# Patient Record
Sex: MALE | Race: WHITE
[De-identification: names, ages, dates, MRNs, and addresses within clinical notes are randomized per-mention and may not be internally consistent; named-entity substitution may affect disease eponyms.]

---

## 2018-09-26 ENCOUNTER — RX RENEWAL (OUTPATIENT)
Age: 68
End: 2018-09-26

## 2018-11-19 ENCOUNTER — RX RENEWAL (OUTPATIENT)
Age: 68
End: 2018-11-19

## 2019-02-01 ENCOUNTER — RX RENEWAL (OUTPATIENT)
Age: 69
End: 2019-02-01

## 2019-03-07 ENCOUNTER — RX RENEWAL (OUTPATIENT)
Age: 69
End: 2019-03-07

## 2019-04-30 ENCOUNTER — RX RENEWAL (OUTPATIENT)
Age: 69
End: 2019-04-30

## 2019-08-01 ENCOUNTER — RX RENEWAL (OUTPATIENT)
Age: 69
End: 2019-08-01

## 2019-08-20 ENCOUNTER — RX RENEWAL (OUTPATIENT)
Age: 69
End: 2019-08-20

## 2019-08-20 ENCOUNTER — APPOINTMENT (OUTPATIENT)
Dept: HEART AND VASCULAR | Facility: CLINIC | Age: 69
End: 2019-08-20

## 2019-08-27 ENCOUNTER — APPOINTMENT (OUTPATIENT)
Dept: HEART AND VASCULAR | Facility: CLINIC | Age: 69
End: 2019-08-27
Payer: MEDICARE

## 2019-08-27 ENCOUNTER — NON-APPOINTMENT (OUTPATIENT)
Age: 69
End: 2019-08-27

## 2019-08-27 VITALS — BODY MASS INDEX: 42.66 KG/M2 | HEIGHT: 72 IN | WEIGHT: 315 LBS

## 2019-08-27 VITALS
DIASTOLIC BLOOD PRESSURE: 80 MMHG | HEIGHT: 72 IN | SYSTOLIC BLOOD PRESSURE: 116 MMHG | HEART RATE: 90 BPM | RESPIRATION RATE: 12 BRPM | WEIGHT: 315 LBS | BODY MASS INDEX: 42.66 KG/M2

## 2019-08-27 PROCEDURE — 93000 ELECTROCARDIOGRAM COMPLETE: CPT

## 2019-08-27 PROCEDURE — 99214 OFFICE O/P EST MOD 30 MIN: CPT

## 2019-08-27 RX ORDER — METFORMIN HYDROCHLORIDE 1000 MG/1
1000 TABLET, COATED ORAL
Qty: 60 | Refills: 4 | Status: DISCONTINUED | COMMUNITY
Start: 2019-02-01 | End: 2019-08-27

## 2019-08-27 NOTE — HISTORY OF PRESENT ILLNESS
[FreeTextEntry1] : 69-year-old male with a past medical history of diabetes hypertension hyperlipidemia comes in for routine followup. Patient denies any chest pain shortness of breath PND or orthopnea. However, does complain of severe lower back pain on exertion relieved with rest. Denies any intermittent claudication.

## 2019-08-27 NOTE — PHYSICAL EXAM
[Normal Appearance] : normal appearance [General Appearance - Well Developed] : well developed [Well Groomed] : well groomed [No Deformities] : no deformities [Normal Oral Mucosa] : normal oral mucosa [General Appearance - In No Acute Distress] : no acute distress [FreeTextEntry1] : overweight [No Oral Cyanosis] : no oral cyanosis [No Oral Pallor] : no oral pallor [Normal Jugular Venous A Waves Present] : normal jugular venous A waves present [Normal Jugular Venous V Waves Present] : normal jugular venous V waves present [No Jugular Venous Yuen A Waves] : no jugular venous yuen A waves [Exaggerated Use Of Accessory Muscles For Inspiration] : no accessory muscle use [Respiration, Rhythm And Depth] : normal respiratory rhythm and effort [Auscultation Breath Sounds / Voice Sounds] : lungs were clear to auscultation bilaterally [Heart Rate And Rhythm] : heart rate and rhythm were normal [Heart Sounds] : normal S1 and S2 [Arterial Pulses Normal] : the arterial pulses were normal [Edema] : no peripheral edema present [Abdomen Soft] : soft [Abdomen Tenderness] : non-tender [Abdomen Mass (___ Cm)] : no abdominal mass palpated [Abnormal Walk] : normal gait [Gait - Sufficient For Exercise Testing] : the gait was sufficient for exercise testing [Nail Clubbing] : no clubbing of the fingernails [Cyanosis, Localized] : no localized cyanosis [Petechial Hemorrhages (___cm)] : no petechial hemorrhages [] : no ischemic changes [Oriented To Time, Place, And Person] : oriented to person, place, and time [Affect] : the affect was normal [Mood] : the mood was normal [No Anxiety] : not feeling anxious

## 2019-08-27 NOTE — DISCUSSION/SUMMARY
[FreeTextEntry1] : 1. Hypertension: Well controlled on enalapril 10 mg twice daily advised to maintain a low salt diet weight loss.\par 2. Hyperlipidemia: Continue Crestor 10 mg daily will obtain blood work and advise once results noted. Advised low-fat diet\par 3. Diabetes: Continue Januvia 100 mg daily metformin 1000 mg twice daily advised aspirin 81 mg daily to see ophthalmology and podiatry yearly.\par \par pt refusing echo today and advised t ohave screening CF. pt refusing states he sees surgeon yearly.

## 2019-08-29 ENCOUNTER — RX RENEWAL (OUTPATIENT)
Age: 69
End: 2019-08-29

## 2019-08-29 DIAGNOSIS — E78.1 PURE HYPERGLYCERIDEMIA: ICD-10-CM

## 2019-08-29 LAB
25(OH)D3 SERPL-MCNC: 19.7 NG/ML
ALBUMIN SERPL ELPH-MCNC: 4.3 G/DL
ALP BLD-CCNC: 79 U/L
ALT SERPL-CCNC: 28 U/L
ANION GAP SERPL CALC-SCNC: 22 MMOL/L
AST SERPL-CCNC: 22 U/L
BASOPHILS # BLD AUTO: 0.05 K/UL
BASOPHILS NFR BLD AUTO: 0.5 %
BILIRUB SERPL-MCNC: 0.3 MG/DL
BUN SERPL-MCNC: 28 MG/DL
CALCIUM SERPL-MCNC: 10 MG/DL
CHLORIDE SERPL-SCNC: 96 MMOL/L
CHOLEST SERPL-MCNC: 170 MG/DL
CHOLEST/HDLC SERPL: 5.3 RATIO
CO2 SERPL-SCNC: 18 MMOL/L
CREAT SERPL-MCNC: 1.24 MG/DL
EOSINOPHIL # BLD AUTO: 0.29 K/UL
EOSINOPHIL NFR BLD AUTO: 2.7 %
ESTIMATED AVERAGE GLUCOSE: 280 MG/DL
FOLATE SERPL-MCNC: 16.3 NG/ML
GLUCOSE SERPL-MCNC: 403 MG/DL
HBA1C MFR BLD HPLC: 11.4 %
HCT VFR BLD CALC: 39.9 %
HDLC SERPL-MCNC: 32 MG/DL
HGB BLD-MCNC: 13 G/DL
IMM GRANULOCYTES NFR BLD AUTO: 0.7 %
LDLC SERPL CALC-MCNC: NORMAL MG/DL
LYMPHOCYTES # BLD AUTO: 1.54 K/UL
LYMPHOCYTES NFR BLD AUTO: 14.6 %
MAGNESIUM SERPL-MCNC: 1.8 MG/DL
MAN DIFF?: NORMAL
MCHC RBC-ENTMCNC: 31.6 PG
MCHC RBC-ENTMCNC: 32.6 GM/DL
MCV RBC AUTO: 96.8 FL
MONOCYTES # BLD AUTO: 0.79 K/UL
MONOCYTES NFR BLD AUTO: 7.5 %
NEUTROPHILS # BLD AUTO: 7.81 K/UL
NEUTROPHILS NFR BLD AUTO: 74 %
PHOSPHATE SERPL-MCNC: 3.5 MG/DL
PLATELET # BLD AUTO: 192 K/UL
POTASSIUM SERPL-SCNC: 5 MMOL/L
PROT SERPL-MCNC: 6.8 G/DL
PSA SERPL-MCNC: 1.01 NG/ML
RBC # BLD: 4.12 M/UL
RBC # FLD: 13.9 %
SODIUM SERPL-SCNC: 136 MMOL/L
T3FREE SERPL-MCNC: 2.42 PG/ML
T4 FREE SERPL-MCNC: 1.2 NG/DL
T4 SERPL-MCNC: 6.4 UG/DL
TRIGL SERPL-MCNC: 701 MG/DL
TSH SERPL-ACNC: 1.71 UIU/ML
VIT B12 SERPL-MCNC: 883 PG/ML
WBC # FLD AUTO: 10.55 K/UL

## 2019-11-18 ENCOUNTER — APPOINTMENT (OUTPATIENT)
Dept: HEART AND VASCULAR | Facility: CLINIC | Age: 69
End: 2019-11-18
Payer: MEDICARE

## 2019-11-18 ENCOUNTER — RX RENEWAL (OUTPATIENT)
Age: 69
End: 2019-11-18

## 2019-11-18 VITALS
SYSTOLIC BLOOD PRESSURE: 112 MMHG | RESPIRATION RATE: 12 BRPM | DIASTOLIC BLOOD PRESSURE: 70 MMHG | WEIGHT: 315 LBS | HEART RATE: 80 BPM | BODY MASS INDEX: 42.66 KG/M2 | HEIGHT: 72 IN

## 2019-11-18 PROCEDURE — 99214 OFFICE O/P EST MOD 30 MIN: CPT

## 2019-11-18 NOTE — DISCUSSION/SUMMARY
[FreeTextEntry1] : 1. Hypertension: Well controlled on enalapril 10 mg twice daily advised to continue maintain a low-salt diet and continued weight loss.\par 2. Hyperlipidemia: Continue gemfibrozil and Crestor maintain a low-fat diet\par 3. Diabetes: Continue oral hypoglycemics maintain a low glycemic diet will obtain blood work and advise once results are known. Advised to see ophthalmology yearly\par 4. Back pain: Concerned about the need for rest after half a block of walking. Will refer her for an MRI of the LS-spine and advise once results known.

## 2019-11-18 NOTE — PHYSICAL EXAM
[General Appearance - Well Developed] : well developed [Well Groomed] : well groomed [Normal Appearance] : normal appearance [General Appearance - In No Acute Distress] : no acute distress [No Deformities] : no deformities [Normal Oral Mucosa] : normal oral mucosa [FreeTextEntry1] : obese [No Oral Pallor] : no oral pallor [No Oral Cyanosis] : no oral cyanosis [Normal Jugular Venous A Waves Present] : normal jugular venous A waves present [No Jugular Venous Yuen A Waves] : no jugular venous yuen A waves [Normal Jugular Venous V Waves Present] : normal jugular venous V waves present [Respiration, Rhythm And Depth] : normal respiratory rhythm and effort [Auscultation Breath Sounds / Voice Sounds] : lungs were clear to auscultation bilaterally [Exaggerated Use Of Accessory Muscles For Inspiration] : no accessory muscle use [Heart Rate And Rhythm] : heart rate and rhythm were normal [Heart Sounds] : normal S1 and S2 [Arterial Pulses Normal] : the arterial pulses were normal [Edema] : no peripheral edema present [Abdomen Soft] : soft [Abdomen Tenderness] : non-tender [Abdomen Mass (___ Cm)] : no abdominal mass palpated [Abnormal Walk] : normal gait [Gait - Sufficient For Exercise Testing] : the gait was sufficient for exercise testing [Cyanosis, Localized] : no localized cyanosis [Petechial Hemorrhages (___cm)] : no petechial hemorrhages [Nail Clubbing] : no clubbing of the fingernails [] : no ischemic changes [Oriented To Time, Place, And Person] : oriented to person, place, and time [Affect] : the affect was normal [No Anxiety] : not feeling anxious [Mood] : the mood was normal

## 2019-11-18 NOTE — HISTORY OF PRESENT ILLNESS
[FreeTextEntry1] : 69-year-old male with a past medical history hypertension hyperlipidemia and diabetes comes in for routine followup. Patient does report significant lumbosacral spine pain to the point is unable to walk half a block without stopping due to pain in the LS spine area radiating to bilateral flanks. Denies any chest pain shortness of breath PND or orthopnea.

## 2019-11-20 LAB
ALBUMIN SERPL ELPH-MCNC: 4.4 G/DL
ALP BLD-CCNC: 91 U/L
ALT SERPL-CCNC: 15 U/L
ANION GAP SERPL CALC-SCNC: 17 MMOL/L
AST SERPL-CCNC: 15 U/L
BASOPHILS # BLD AUTO: 0.06 K/UL
BASOPHILS NFR BLD AUTO: 0.7 %
BILIRUB SERPL-MCNC: 0.2 MG/DL
BUN SERPL-MCNC: 38 MG/DL
CALCIUM SERPL-MCNC: 10.2 MG/DL
CHLORIDE SERPL-SCNC: 102 MMOL/L
CHOLEST SERPL-MCNC: 135 MG/DL
CHOLEST/HDLC SERPL: 3.9 RATIO
CO2 SERPL-SCNC: 18 MMOL/L
CREAT SERPL-MCNC: 1.33 MG/DL
EOSINOPHIL # BLD AUTO: 0.32 K/UL
EOSINOPHIL NFR BLD AUTO: 3.5 %
ESTIMATED AVERAGE GLUCOSE: 229 MG/DL
GLUCOSE SERPL-MCNC: 363 MG/DL
HBA1C MFR BLD HPLC: 9.6 %
HCT VFR BLD CALC: 37.2 %
HDLC SERPL-MCNC: 35 MG/DL
HGB BLD-MCNC: 11.8 G/DL
IMM GRANULOCYTES NFR BLD AUTO: 0.6 %
LDLC SERPL CALC-MCNC: 42 MG/DL
LYMPHOCYTES # BLD AUTO: 1.65 K/UL
LYMPHOCYTES NFR BLD AUTO: 18.3 %
MAN DIFF?: NORMAL
MCHC RBC-ENTMCNC: 31.7 GM/DL
MCHC RBC-ENTMCNC: 32.2 PG
MCV RBC AUTO: 101.4 FL
MONOCYTES # BLD AUTO: 0.77 K/UL
MONOCYTES NFR BLD AUTO: 8.5 %
NEUTROPHILS # BLD AUTO: 6.18 K/UL
NEUTROPHILS NFR BLD AUTO: 68.4 %
PLATELET # BLD AUTO: 215 K/UL
POTASSIUM SERPL-SCNC: 5.5 MMOL/L
PROT SERPL-MCNC: 6.9 G/DL
PSA SERPL-MCNC: 1.1 NG/ML
RBC # BLD: 3.67 M/UL
RBC # FLD: 13.9 %
SODIUM SERPL-SCNC: 137 MMOL/L
TRIGL SERPL-MCNC: 289 MG/DL
TSH SERPL-ACNC: 1.35 UIU/ML
WBC # FLD AUTO: 9.03 K/UL

## 2020-01-08 ENCOUNTER — RX RENEWAL (OUTPATIENT)
Age: 70
End: 2020-01-08

## 2020-03-16 ENCOUNTER — APPOINTMENT (OUTPATIENT)
Dept: HEART AND VASCULAR | Facility: CLINIC | Age: 70
End: 2020-03-16

## 2020-05-06 ENCOUNTER — RX RENEWAL (OUTPATIENT)
Age: 70
End: 2020-05-06

## 2020-05-07 ENCOUNTER — RX RENEWAL (OUTPATIENT)
Age: 70
End: 2020-05-07

## 2020-05-26 ENCOUNTER — TRANSCRIPTION ENCOUNTER (OUTPATIENT)
Age: 70
End: 2020-05-26

## 2020-05-27 ENCOUNTER — TRANSCRIPTION ENCOUNTER (OUTPATIENT)
Age: 70
End: 2020-05-27

## 2020-05-27 ENCOUNTER — INPATIENT (INPATIENT)
Facility: HOSPITAL | Age: 70
LOS: 3 days | Discharge: HOME | DRG: 853 | End: 2020-05-31
Attending: SPECIALIST | Admitting: SPECIALIST
Payer: MEDICARE

## 2020-05-27 VITALS
WEIGHT: 315 LBS | OXYGEN SATURATION: 94 % | SYSTOLIC BLOOD PRESSURE: 150 MMHG | HEIGHT: 72 IN | RESPIRATION RATE: 18 BRPM | TEMPERATURE: 99 F | DIASTOLIC BLOOD PRESSURE: 73 MMHG | HEART RATE: 114 BPM

## 2020-05-27 DIAGNOSIS — Z87.891 PERSONAL HISTORY OF NICOTINE DEPENDENCE: ICD-10-CM

## 2020-05-27 DIAGNOSIS — Z91.11 PATIENT'S NONCOMPLIANCE WITH DIETARY REGIMEN: ICD-10-CM

## 2020-05-27 DIAGNOSIS — Z79.84 LONG TERM (CURRENT) USE OF ORAL HYPOGLYCEMIC DRUGS: ICD-10-CM

## 2020-05-27 LAB
ALBUMIN SERPL ELPH-MCNC: 4.2 G/DL — SIGNIFICANT CHANGE UP (ref 3.3–5)
ALP SERPL-CCNC: 96 U/L — SIGNIFICANT CHANGE UP (ref 40–120)
ALT FLD-CCNC: 11 U/L — SIGNIFICANT CHANGE UP (ref 10–45)
ANION GAP SERPL CALC-SCNC: 14 MMOL/L — SIGNIFICANT CHANGE UP (ref 5–17)
APTT BLD: 36.5 SEC — HIGH (ref 27.5–36.3)
AST SERPL-CCNC: 14 U/L — SIGNIFICANT CHANGE UP (ref 10–40)
BASOPHILS # BLD AUTO: 0.06 K/UL — SIGNIFICANT CHANGE UP (ref 0–0.2)
BASOPHILS NFR BLD AUTO: 0.5 % — SIGNIFICANT CHANGE UP (ref 0–2)
BILIRUB SERPL-MCNC: 0.2 MG/DL — SIGNIFICANT CHANGE UP (ref 0.2–1.2)
BLD GP AB SCN SERPL QL: NEGATIVE — SIGNIFICANT CHANGE UP
BUN SERPL-MCNC: 21 MG/DL — SIGNIFICANT CHANGE UP (ref 7–23)
CALCIUM SERPL-MCNC: 9.9 MG/DL — SIGNIFICANT CHANGE UP (ref 8.4–10.5)
CHLORIDE SERPL-SCNC: 100 MMOL/L — SIGNIFICANT CHANGE UP (ref 96–108)
CO2 SERPL-SCNC: 19 MMOL/L — LOW (ref 22–31)
CREAT SERPL-MCNC: 1.06 MG/DL — SIGNIFICANT CHANGE UP (ref 0.5–1.3)
EOSINOPHIL # BLD AUTO: 0.37 K/UL — SIGNIFICANT CHANGE UP (ref 0–0.5)
EOSINOPHIL NFR BLD AUTO: 2.8 % — SIGNIFICANT CHANGE UP (ref 0–6)
GLUCOSE BLDC GLUCOMTR-MCNC: 264 MG/DL — HIGH (ref 70–99)
GLUCOSE SERPL-MCNC: 290 MG/DL — HIGH (ref 70–99)
GRAM STN FLD: SIGNIFICANT CHANGE UP
HCT VFR BLD CALC: 38.8 % — LOW (ref 39–50)
HGB BLD-MCNC: 12.8 G/DL — LOW (ref 13–17)
IMM GRANULOCYTES NFR BLD AUTO: 0.7 % — SIGNIFICANT CHANGE UP (ref 0–1.5)
INR BLD: 1.06 — SIGNIFICANT CHANGE UP (ref 0.88–1.16)
LYMPHOCYTES # BLD AUTO: 1.77 K/UL — SIGNIFICANT CHANGE UP (ref 1–3.3)
LYMPHOCYTES # BLD AUTO: 13.4 % — SIGNIFICANT CHANGE UP (ref 13–44)
MCHC RBC-ENTMCNC: 31.1 PG — SIGNIFICANT CHANGE UP (ref 27–34)
MCHC RBC-ENTMCNC: 33 GM/DL — SIGNIFICANT CHANGE UP (ref 32–36)
MCV RBC AUTO: 94.2 FL — SIGNIFICANT CHANGE UP (ref 80–100)
MONOCYTES # BLD AUTO: 1.15 K/UL — HIGH (ref 0–0.9)
MONOCYTES NFR BLD AUTO: 8.7 % — SIGNIFICANT CHANGE UP (ref 2–14)
NEUTROPHILS # BLD AUTO: 9.8 K/UL — HIGH (ref 1.8–7.4)
NEUTROPHILS NFR BLD AUTO: 73.9 % — SIGNIFICANT CHANGE UP (ref 43–77)
NRBC # BLD: 0 /100 WBCS — SIGNIFICANT CHANGE UP (ref 0–0)
PLATELET # BLD AUTO: 284 K/UL — SIGNIFICANT CHANGE UP (ref 150–400)
POTASSIUM SERPL-MCNC: 4.2 MMOL/L — SIGNIFICANT CHANGE UP (ref 3.5–5.3)
POTASSIUM SERPL-SCNC: 4.2 MMOL/L — SIGNIFICANT CHANGE UP (ref 3.5–5.3)
PROT SERPL-MCNC: 8 G/DL — SIGNIFICANT CHANGE UP (ref 6–8.3)
PROTHROM AB SERPL-ACNC: 12.1 SEC — SIGNIFICANT CHANGE UP (ref 10–12.9)
RBC # BLD: 4.12 M/UL — LOW (ref 4.2–5.8)
RBC # FLD: 13.9 % — SIGNIFICANT CHANGE UP (ref 10.3–14.5)
RH IG SCN BLD-IMP: POSITIVE — SIGNIFICANT CHANGE UP
SARS-COV-2 RNA SPEC QL NAA+PROBE: SIGNIFICANT CHANGE UP
SODIUM SERPL-SCNC: 133 MMOL/L — LOW (ref 135–145)
SPECIMEN SOURCE: SIGNIFICANT CHANGE UP
WBC # BLD: 13.24 K/UL — HIGH (ref 3.8–10.5)
WBC # FLD AUTO: 13.24 K/UL — HIGH (ref 3.8–10.5)

## 2020-05-27 PROCEDURE — 99285 EMERGENCY DEPT VISIT HI MDM: CPT | Mod: CS

## 2020-05-27 PROCEDURE — 99223 1ST HOSP IP/OBS HIGH 75: CPT | Mod: CS,GC

## 2020-05-27 PROCEDURE — 93010 ELECTROCARDIOGRAM REPORT: CPT

## 2020-05-27 PROCEDURE — 71045 X-RAY EXAM CHEST 1 VIEW: CPT | Mod: 26

## 2020-05-27 RX ORDER — DEXTROSE 50 % IN WATER 50 %
15 SYRINGE (ML) INTRAVENOUS ONCE
Refills: 0 | Status: DISCONTINUED | OUTPATIENT
Start: 2020-05-27 | End: 2020-05-31

## 2020-05-27 RX ORDER — VANCOMYCIN HCL 1 G
2000 VIAL (EA) INTRAVENOUS ONCE
Refills: 0 | Status: COMPLETED | OUTPATIENT
Start: 2020-05-27 | End: 2020-05-27

## 2020-05-27 RX ORDER — SODIUM CHLORIDE 9 MG/ML
1000 INJECTION, SOLUTION INTRAVENOUS
Refills: 0 | Status: DISCONTINUED | OUTPATIENT
Start: 2020-05-28 | End: 2020-05-28

## 2020-05-27 RX ORDER — GLUCAGON INJECTION, SOLUTION 0.5 MG/.1ML
1 INJECTION, SOLUTION SUBCUTANEOUS ONCE
Refills: 0 | Status: DISCONTINUED | OUTPATIENT
Start: 2020-05-27 | End: 2020-05-31

## 2020-05-27 RX ORDER — GEMFIBROZIL 600 MG
600 TABLET ORAL
Refills: 0 | Status: DISCONTINUED | OUTPATIENT
Start: 2020-05-27 | End: 2020-05-31

## 2020-05-27 RX ORDER — PIPERACILLIN AND TAZOBACTAM 4; .5 G/20ML; G/20ML
3.38 INJECTION, POWDER, LYOPHILIZED, FOR SOLUTION INTRAVENOUS EVERY 6 HOURS
Refills: 0 | Status: DISCONTINUED | OUTPATIENT
Start: 2020-05-27 | End: 2020-05-29

## 2020-05-27 RX ORDER — PIPERACILLIN AND TAZOBACTAM 4; .5 G/20ML; G/20ML
3.38 INJECTION, POWDER, LYOPHILIZED, FOR SOLUTION INTRAVENOUS ONCE
Refills: 0 | Status: COMPLETED | OUTPATIENT
Start: 2020-05-27 | End: 2020-05-27

## 2020-05-27 RX ORDER — VANCOMYCIN HCL 1 G
2000 VIAL (EA) INTRAVENOUS EVERY 12 HOURS
Refills: 0 | Status: DISCONTINUED | OUTPATIENT
Start: 2020-05-28 | End: 2020-05-29

## 2020-05-27 RX ORDER — INSULIN LISPRO 100/ML
VIAL (ML) SUBCUTANEOUS
Refills: 0 | Status: DISCONTINUED | OUTPATIENT
Start: 2020-05-27 | End: 2020-05-31

## 2020-05-27 RX ORDER — SODIUM CHLORIDE 9 MG/ML
1000 INJECTION, SOLUTION INTRAVENOUS
Refills: 0 | Status: DISCONTINUED | OUTPATIENT
Start: 2020-05-27 | End: 2020-05-31

## 2020-05-27 RX ORDER — ACETAMINOPHEN 500 MG
650 TABLET ORAL EVERY 6 HOURS
Refills: 0 | Status: DISCONTINUED | OUTPATIENT
Start: 2020-05-27 | End: 2020-05-31

## 2020-05-27 RX ORDER — DEXTROSE 50 % IN WATER 50 %
25 SYRINGE (ML) INTRAVENOUS ONCE
Refills: 0 | Status: DISCONTINUED | OUTPATIENT
Start: 2020-05-27 | End: 2020-05-31

## 2020-05-27 RX ORDER — ATORVASTATIN CALCIUM 80 MG/1
40 TABLET, FILM COATED ORAL AT BEDTIME
Refills: 0 | Status: DISCONTINUED | OUTPATIENT
Start: 2020-05-27 | End: 2020-05-31

## 2020-05-27 RX ORDER — DEXTROSE 50 % IN WATER 50 %
12.5 SYRINGE (ML) INTRAVENOUS ONCE
Refills: 0 | Status: DISCONTINUED | OUTPATIENT
Start: 2020-05-27 | End: 2020-05-31

## 2020-05-27 RX ADMIN — PIPERACILLIN AND TAZOBACTAM 200 GRAM(S): 4; .5 INJECTION, POWDER, LYOPHILIZED, FOR SOLUTION INTRAVENOUS at 22:03

## 2020-05-27 RX ADMIN — PIPERACILLIN AND TAZOBACTAM 3.38 GRAM(S): 4; .5 INJECTION, POWDER, LYOPHILIZED, FOR SOLUTION INTRAVENOUS at 16:25

## 2020-05-27 RX ADMIN — ATORVASTATIN CALCIUM 40 MILLIGRAM(S): 80 TABLET, FILM COATED ORAL at 22:03

## 2020-05-27 RX ADMIN — Medication 6: at 22:03

## 2020-05-27 RX ADMIN — Medication 250 MILLIGRAM(S): at 16:40

## 2020-05-27 RX ADMIN — PIPERACILLIN AND TAZOBACTAM 200 GRAM(S): 4; .5 INJECTION, POWDER, LYOPHILIZED, FOR SOLUTION INTRAVENOUS at 16:06

## 2020-05-27 NOTE — H&P ADULT - HISTORY OF PRESENT ILLNESS
70M w/ PMHx DM, HTN, HLD, PSHx debridement of neck abscess w/ Dr Dixon 5 years years ago, now p/w recurrent neck abscess, onset 6 days ago. Pt started on Bactrim 3 days ago w/o improvement. Pt referred to the ED today by Dr Dixon for admission for OR tomorrow. No f/c. No URI, GI or  sx. No dec ROM neck. No other complaints. Of note, pt COVID+ 3/30, and states his repeat test 4 weeks ago was negative, and has been asymptomatic. 70M w/ PMHx DM, HTN, HLD, L upper back abscess c/b nec fascitis s/p surgical debridement by Dr. Memo Lancaster, failed STSG and prolonged wound care course in 03/2015 now presenting with abscess in same area x 6 days. Patient reports that he began to feel discomfort on 5/22. On 5/23 patient began to have bloody purulent drainage from site and called Dr. Dixon office for appointment. Patient started on Bactrim 3 days ago without improvement. Of note, patient COVID + 3/30 and was feeling unwell for 3-4weeks. During that time was not paying attention to diet or diabetes management.     Patient seen in office today and referred to ED for admission for surgical debridement 5/28.

## 2020-05-27 NOTE — H&P ADULT - NSHPPHYSICALEXAM_GEN_ALL_CORE
NAD  Obese  Back: erythematous scar tissue over left upper back, at superior edge at base of neck tender bullae present draining pus (cultured)

## 2020-05-27 NOTE — ED PROVIDER NOTE - OBJECTIVE STATEMENT
Pt w/ PMHx DM, HTN, HLD, PSHx debridement of neck abscess w/ Dr Dixon years ago, now p/w recurrent neck abscess, onset 6 days ago. Pt started on Bactrim 3 days ago w/o improvement. Pt referred to the ED today by Dr Dixon for admission for OR tomorrow. No f/c. No URI, GI or  sx. No dec ROM neck. No other complaints. Of note, pt COVID+ 3/30, and states his repeat test 4 weeks ago was negative, and has been asymptomatic.

## 2020-05-27 NOTE — H&P ADULT - ASSESSMENT
70M w/PMHx DM, HTN, HLD, L upper back abscess c/b nec fascitis s/p surgical debridement by Dr. Memo Lancaster, failed STSG and prolonged wound care course in 03/2015 now presenting with abscess in same area. Patient for surgical debridement 5/28.   -Admit to ENT  -Vanc/Zosyn   -f/u cx   -Pre op labs, Hgb A1C   -NPO/IVF at midnight  -home meds, ISS  -Medical clearance for OR

## 2020-05-27 NOTE — CONSULT NOTE ADULT - ASSESSMENT
69 yo M w/ PMHx DM, HTN, HLD, L upper back abscess c/b necrotizing fascitis s/p surgical debridement by Dr. Memo Lancaster, failed split thickness skin grafts (STSG) and prolonged wound care course in 03/2015 now presenting with abscess in same area x 6 days, not responding to 3 days course of bactrim. Patient admitted for surgical debridement on 5/28. Medicine consulted for pre-op evaluation    #Pre-op evaluation  - Patient is able to perform >4 METS, RCRI class 1 risk, Bolton score of .1%. NSQIP 7.0% (below average) risk for serious complication, 8.0% (below average) risk for any complication  - EKG w/ NSR, no ischemic changes  - CXR: no acute infiltrates  - patient is low risk for low risk procedure  - Patient is medically optimized for procedure    #L upper back abscess  s/p prolonged wound care course in 03/2015 now presenting with abscess in same area. Patient for surgical debridement 5/28 by ENT  - management as per primary team    #Sepsis  Patient meeting 2/4 SIRS criteria on admission (HR, WBC), source likely back abscess. No urinary symptoms, CXR without obvious infiltrates, no other skin breakdown  - f/u BCx  - abscess cultures  - c/w IV abx (Vanc trough prior to the 4th dose)  - Tylenol PRN for fevers    #COVID +ve   Patient tested positive on 3/30 and was feeling unwell for 3-4weeks. Currently asymptomatic, repeat swab on 5/27 negative  - will not treat COVID PNA at this time  - c/w current management    #DM  On home Metformin 1000mg, Farxiga 10mg and Sitagliptin 100mg   -A1c 8.9  -monitor FSG  -insulin sliding scale  -may need basal-bolus insulin depending on FSG    #HTN  - c/w Enalapril 10mg BID    #HLD  - c/w Gemfibrozil 600mg BID    #Anemia  Normocytic anemia, unclear baseline Hgb. Patient presenting with Hgb 12.8, no s/s of bleeding, no reported melena, hematochezia, hematemesis  - obtain Fe studies, TSH, B12/Folate  - monitor CBC  - maintain active T&S  - transfuse for Hgb <7    #R hemidiaphragm elevation  Seen on CXR, as well as multiple linear bands of the right lower lung zone to suggest linear atelectasis and/or parenchymal scarring  - currently patient without respiratory complaints  - please obtain collateral from PMD for prior imaging and need for follow up as outpatient 69 yo M w/ PMH of DM, HTN, HLD, L upper back abscess c/b necrotizing fascitis s/p surgical debridement by Dr. Memo Lancaster, failed split thickness skin grafts (STSG) and prolonged wound care course in 03/2015 now presenting with abscess in same area x 6 days, not responding to 3 days course of bactrim. Patient admitted for surgical debridement on 5/28. Medicine consulted for pre-op evaluation    #Pre-op evaluation  - Patient is able to perform >4 METS, RCRI class 1 risk, Bolton score of .1%. NSQIP 7.0% (below average) risk for serious complication, 8.0% (below average) risk for any complication  - EKG w/ NSR (HR 95), no ischemic changes  - CXR: no acute infiltrates  - patient is low risk for low risk procedure  - Patient is medically optimized for procedure    #L posterior upper back abscess  s/p prolonged wound care course in 03/2015 now presenting with abscess in same area. Patient for surgical debridement 5/28 by ENT  - management as per primary team    #Sepsis  Patient meeting 2/4 SIRS criteria on admission (HR, WBC), source likely back abscess. No urinary symptoms, CXR without obvious infiltrates, no other skin breakdown  - f/u BCx  - abscess cultures  - c/w IV abx (Vanc trough prior to the 4th dose)  - Tylenol PRN for fevers    #COVID +ve   Patient tested positive on 3/30 and was feeling unwell for 3-4weeks. Currently asymptomatic, repeat swab on 5/27 negative  - will not treat COVID PNA at this time  - c/w current management    #DM  On home Metformin 1000mg BID, Farxiga 10mg and Sitagliptin 100mg qd  -A1c 8.9  -monitor FSG  -insulin sliding scale  -may need basal-bolus insulin depending on FSG    #HTN  - c/w Enalapril 10mg BID    #HLD  - c/w Gemfibrozil 600mg BID  - c/w Crestor 10mg qd    #Anemia  Normocytic anemia, unclear baseline Hgb. Patient presenting with Hgb 12.8, no s/s of bleeding, no reported melena, hematochezia, hematemesis  - obtain Fe studies, TSH, B12/Folate  - monitor CBC  - maintain active T&S  - transfuse for Hgb <7    #R hemidiaphragm elevation  Seen on CXR, as well as multiple linear bands of the right lower lung zone to suggest linear atelectasis and/or parenchymal scarring  - currently patient without respiratory complaints  - please obtain collateral from PMD for prior imaging and need for follow up as outpatient 69 yo M w/ PMH of DM, HTN, HLD, L upper back abscess c/b necrotizing fascitis s/p surgical debridement by Dr. Memo Lancaster, failed split thickness skin grafts (STSG) and prolonged wound care course in 03/2015 now presenting with abscess in same area x 6 days, not responding to 3 days course of bactrim. Patient admitted for surgical debridement on 5/28. Medicine consulted for pre-op evaluation    #Pre-op evaluation  - Patient is able to perform >4 METS, RCRI class 1 risk, Bolton score of .1%. NSQIP 7.0% (below average) risk for serious complication, 8.0% (below average) risk for any complication  - EKG w/ NSR (HR 95), no ischemic changes  - CXR: no acute infiltrates  - patient is low risk for low risk procedure  - Patient is medically optimized for procedure    #L posterior upper back abscess  s/p prolonged wound care course in 03/2015 now presenting with abscess in same area. Patient for surgical debridement 5/28 by ENT  - management as per primary team    #Sepsis  Patient meeting 2/4 SIRS criteria on admission (HR, WBC), source likely back abscess. No urinary symptoms, CXR without obvious infiltrates, no other skin breakdown  - f/u BCx  - abscess cultures  - c/w IV abx (Vanc trough prior to the 4th dose)  - Tylenol PRN for fevers    #COVID +ve   Patient tested positive on 3/30 and was feeling unwell for 3-4weeks. Currently asymptomatic, repeat swab on 5/27 negative  - will not treat COVID PNA at this time  - c/w current management    #DM  On home Metformin 1000mg BID, Farxiga 10mg and Sitagliptin 100mg qd  -A1c 8.9  -monitor FSG  -insulin sliding scale  -may need basal-bolus insulin depending on FSG    #HTN  - c/w Enalapril 10mg BID    #HLD  - c/w Gemfibrozil 600mg BID  - c/w Crestor 10mg qd    #Anemia  Normocytic anemia, unclear baseline Hgb. Patient presenting with Hgb 12.8, no s/s of bleeding, no reported melena, hematochezia, hematemesis  - obtain Fe studies, TSH, B12/Folate  - monitor CBC  - maintain active T&S  - transfuse for Hgb <7    #R hemidiaphragm elevation  Seen on CXR, as well as multiple linear bands of the right lower lung zone to suggest linear atelectasis and/or parenchymal scarring  - currently patient without respiratory complaints  - please obtain collateral from PMD for prior imaging and need for follow up as outpatient  - consider CT chest if patient with respiratory symptoms 69 yo M w/ PMH of DM, HTN, HLD, L upper back abscess c/b necrotizing fascitis s/p surgical debridement by Dr. Memo Lancaster, failed split thickness skin grafts (STSG) and prolonged wound care course in 03/2015 now presenting with abscess in same area x 6 days, not responding to 3 days course of bactrim. Patient admitted for surgical debridement on 5/28. Medicine consulted for pre-op evaluation    #Pre-op evaluation  - Patient is able to perform >4 METS, RCRI class 1 risk, Bolton score of .1%. NSQIP 7.0% (below average) risk for serious complication, 8.0% (below average) risk for any complication  - EKG w/ NSR (HR 95), no ischemic changes  - CXR: no acute infiltrates  - patient is low risk for low risk procedure  - Patient is medically optimized for procedure    #L posterior upper back abscess  s/p prolonged wound care course in 03/2015 now presenting with abscess in same area. Patient for surgical debridement 5/28 by ENT  - management as per primary team    #Sepsis  Patient meeting 2/4 SIRS criteria on admission (HR, WBC), source likely back abscess. No urinary symptoms, CXR without obvious infiltrates, no other skin breakdown  - f/u BCx  - abscess cultures  - c/w IV abx (Vanc trough prior to the 4th dose)  - Tylenol PRN for fevers    #COVID +ve   Patient tested positive on 3/30 and was feeling unwell for 3-4weeks. Currently asymptomatic, repeat swab on 5/27 negative  - will not treat COVID PNA at this time  - c/w current management    #DM  On home Metformin 1000mg BID, Farxiga 10mg and Sitagliptin 100mg qd  -A1c 8.9  -monitor FSG  -insulin sliding scale  -may need basal-bolus insulin depending on FSG    #HTN  - c/w Enalapril 10mg BID    ADDENDUM : held ACEI prior to procedure , restart prn     #HLD  - c/w Gemfibrozil 600mg BID  - c/w Crestor 10mg qd    #Anemia  Normocytic anemia, unclear baseline Hgb. Patient presenting with Hgb 12.8, no s/s of bleeding, no reported melena, hematochezia, hematemesis  - obtain Fe studies, TSH, B12/Folate  - monitor CBC  - maintain active T&S  - transfuse for Hgb <7    #R hemidiaphragm elevation  Seen on CXR, as well as multiple linear bands of the right lower lung zone to suggest linear atelectasis and/or parenchymal scarring  - currently patient without respiratory complaints  - please obtain collateral from PMD for prior imaging and need for follow up as outpatient  - consider CT chest if patient with respiratory symptoms

## 2020-05-27 NOTE — ED ADULT NURSE NOTE - OBJECTIVE STATEMENT
pt received into spot 18 A&Ox3 ambulatory appears comfortable arrives via walk in triage for eval of recurrent neck abscess. Pt states he has hx of surgery to this same area he had surgery with md gutierrez at that time though state she is unsure what kind of surgery or what it was. Reports wound increased pain with drainage on Monday has been on an abx x 3 days 2 times a day denies fever chills trauma/ injury to area. Denies fever chills CP SOB toelrates secretions speaks full sentences. Respirations appear even and unlabored sating at 97% on room air abd soft nondistended 18G placed to LAC labs drawn anfd sent 12 lead ekg done will monitor and reassess pt in nad

## 2020-05-27 NOTE — CONSULT NOTE ADULT - SUBJECTIVE AND OBJECTIVE BOX
HPI:  69 yo M w/ PMHx DM, HTN, HLD, L upper back abscess c/b necrotizing fascitis s/p surgical debridement by Dr. Memo Lancaster, failed split thickness skin grafts (STSG) and prolonged wound care course in 03/2015 now presenting with abscess in same area x 6 days. Patient reports that he began to feel discomfort on 5/22. On 5/23 patient began to have bloody purulent drainage from site and called Dr. Dixon's office for appointment. Patient started on Bactrim 3 days ago without improvement. Of note, patient COVID +ve on 3/30 and was feeling unwell for 3-4weeks. During that time was not paying attention to diet or diabetes management.     Patient seen in office today and referred to ED for admission for surgical debridement 5/28. Medicine consulted for pre-op evaluation    ADDITIONAL ID HPI:    PAST MEDICAL & SURGICAL HISTORY:    Home Medications:  Metformin 1000mg qd  Farxiga 10mg   Sitagliptin 100mg   Eliquis 2.5mg BID  Enalapril 10mg BID  Gemfibrozil 600mg BID    OTHER MEDICATIONS:  acetaminophen   Tablet .. 650 milliGRAM(s) Oral every 6 hours PRN  atorvastatin 40 milliGRAM(s) Oral at bedtime  dextrose 40% Gel 15 Gram(s) Oral once PRN  dextrose 5%. 1000 milliLiter(s) IV Continuous <Continuous>  dextrose 50% Injectable 12.5 Gram(s) IV Push once  dextrose 50% Injectable 25 Gram(s) IV Push once  dextrose 50% Injectable 25 Gram(s) IV Push once  enalapril 10 milliGRAM(s) Oral two times a day  gemfibrozil 600 milliGRAM(s) Oral two times a day  glucagon  Injectable 1 milliGRAM(s) IntraMuscular once PRN  insulin lispro (HumaLOG) corrective regimen sliding scale   SubCutaneous Before meals and at bedtime    ALLERGIES:  piperacillin/tazobactam IVPB.. 3.375 Gram(s) IV Intermittent every 6 hours    Allergies    No Known Allergies    Intolerances    FAMILY HISTORY:    Social Hx:    ROS:    VITAL SIGNS:  Vital Signs Last 24 Hrs  T(C): 36.9 (27 May 2020 20:56), Max: 37.1 (27 May 2020 16:27)  T(F): 98.4 (27 May 2020 20:56), Max: 98.8 (27 May 2020 19:01)  HR: 86 (27 May 2020 20:56) (86 - 114)  BP: 101/51 (27 May 2020 20:56) (101/51 - 155/77)  BP(mean): --  RR: 18 (27 May 2020 20:56) (18 - 18)  SpO2: 95% (27 May 2020 20:56) (94% - 96%)    PHYSICAL EXAM:  General: in NAD, lying comfortably in bed  HEENT: normocephalic, atraumatic; PERRL, anicteric sclera; MMM  Neck: supple, no JVD, no thyromegaly, no lymphadenopathy  Cardiovascular: +S1/S2, RRR, no M/G/R  Respiratory: clear to auscultation B/L; no wheezing, no rales, no rhonchi  Gastrointestinal: soft, NT/ND; +BSx4, no organomegaly  Extremities: WWP; no edema, clubbing or cyanosis  Vascular: 2+ radial, DP/PT pulses B/L  Neurological: AAOx3; no focal deficits    LABS:                        12.8   13.24 )-----------( 284      ( 27 May 2020 14:59 )             38.8     05-27    133<L>  |  100  |  21  ----------------------------<  290<H>  4.2   |  19<L>  |  1.06    Ca    9.9      27 May 2020 14:59    TPro  8.0  /  Alb  4.2  /  TBili  0.2  /  DBili  x   /  AST  14  /  ALT  11  /  AlkPhos  96  05-27    PT/INR - ( 27 May 2020 14:59 )   PT: 12.1 sec;   INR: 1.06          PTT - ( 27 May 2020 14:59 )  PTT:36.5 sec      RADIOLOGY & ADDITIONAL STUDIES:  Xray Chest 1 View-PORTABLE IMMEDIATE (05.27.20 @ 15:57)   FINDINGS:   Despite the limitations in the portable technique the cardia mediastinal structures are within normal limits. There is right hemidiaphragm elevation as well as multiple linear bands of the right lower lung zone to suggest linear atelectasis and/or parenchymal scarring. The chronicity of this finding cannot be determined without the benefit of prior imaging for direct comparison. Soft tissues and osseous structures demonstrates degenerative changes involving the left glenohumeral joint.    IMPRESSION:  Right hemidiaphragm elevation with multiple linear opacities of the right lower lung zone as described above. Comparison with remote imaging may be of value to assess for chronicity. HPI:  69 yo M w/ PMHx DM, HTN, HLD, L upper back abscess c/b necrotizing fascitis s/p surgical debridement by Dr. Memo Lancaster, failed split thickness skin grafts (STSG) and prolonged wound care course in 03/2015 now presenting with abscess in same area x 6 days. Patient reports that he began to feel discomfort on 5/22. On 5/23 patient began to have bloody purulent drainage from site and called Dr. Dixon's office for appointment. Patient started on Bactrim 3 days ago without improvement. Of note, patient COVID +ve on 3/30 and was feeling unwell for 3-4weeks. During that time was not paying attention to diet or diabetes management.     Patient seen in office today and referred to ED for admission for surgical debridement 5/28. Medicine consulted for pre-op evaluation    ADDITIONAL ID HPI: patient reports bactrim helped with the pain, however not with purulent drainage. At baseline, he could walk about 5 blocks, however since he got COVID, he has only been able to walk about 0.5-1 block before stopping to catch his breath. Denies any fever, chills, chest pain, palpitations, headache, dizziness, lightheadedness, nausea/vomiting, diarrhea, constipation.     PAST MEDICAL & SURGICAL HISTORY: as per HPI    Home Medications:  Metformin 1000mg qd  Farxiga 10mg   Sitagliptin 100mg   Enalapril 10mg BID  Crestor 10mg qd  Gemfibrozil 600mg BID  Vitamin D3 qd    OTHER MEDICATIONS:  acetaminophen   Tablet .. 650 milliGRAM(s) Oral every 6 hours PRN  atorvastatin 40 milliGRAM(s) Oral at bedtime  dextrose 40% Gel 15 Gram(s) Oral once PRN  dextrose 5%. 1000 milliLiter(s) IV Continuous <Continuous>  dextrose 50% Injectable 12.5 Gram(s) IV Push once  dextrose 50% Injectable 25 Gram(s) IV Push once  dextrose 50% Injectable 25 Gram(s) IV Push once  enalapril 10 milliGRAM(s) Oral two times a day  gemfibrozil 600 milliGRAM(s) Oral two times a day  glucagon  Injectable 1 milliGRAM(s) IntraMuscular once PRN  insulin lispro (HumaLOG) corrective regimen sliding scale   SubCutaneous Before meals and at bedtime    ALLERGIES:  piperacillin/tazobactam IVPB.. 3.375 Gram(s) IV Intermittent every 6 hours    Allergies    No Known Allergies    Intolerances    FAMILY HISTORY: non-contributory    Social Hx: former smoker, about 50pack year hx, quit 15 yrs ago    ROS: as per HPI    VITAL SIGNS:  Vital Signs Last 24 Hrs  T(C): 36.9 (27 May 2020 20:56), Max: 37.1 (27 May 2020 16:27)  T(F): 98.4 (27 May 2020 20:56), Max: 98.8 (27 May 2020 19:01)  HR: 86 (27 May 2020 20:56) (86 - 114)  BP: 101/51 (27 May 2020 20:56) (101/51 - 155/77)  BP(mean): --  RR: 18 (27 May 2020 20:56) (18 - 18)  SpO2: 95% (27 May 2020 20:56) (94% - 96%)    PHYSICAL EXAM:  General: morbidly obese, in NAD, lying comfortably in bed  HEENT: normocephalic, atraumatic; PERRL, anicteric sclera; MMM  Neck: supple, no JVD, no thyromegaly, no lymphadenopathy. L posterior neck with approx 3-4 cm abscess w/ necrotic appearing center, with some purulent drainage and underlying scar tissue   Cardiovascular: +S1/S2, RRR, no M/G/R  Respiratory: decreased breath sounds on b/l bases; no wheezing, no rales, no rhonchi  Gastrointestinal: obese, soft, NT/ND; +BSx4, no organomegaly  Extremities: WWP; trace pitting edema, chronic venous stasis changes  Vascular: 2+ radial, DP/PT pulses B/L  Neurological: AAOx3; no focal deficits    LABS:                        12.8   13.24 )-----------( 284      ( 27 May 2020 14:59 )             38.8     05-27    133<L>  |  100  |  21  ----------------------------<  290<H>  4.2   |  19<L>  |  1.06    Ca    9.9      27 May 2020 14:59    TPro  8.0  /  Alb  4.2  /  TBili  0.2  /  DBili  x   /  AST  14  /  ALT  11  /  AlkPhos  96  05-27    PT/INR - ( 27 May 2020 14:59 )   PT: 12.1 sec;   INR: 1.06        PTT - ( 27 May 2020 14:59 )  PTT:36.5 sec    RADIOLOGY & ADDITIONAL STUDIES:  Xray Chest 1 View-PORTABLE IMMEDIATE (05.27.20 @ 15:57)   FINDINGS:   Despite the limitations in the portable technique the cardia mediastinal structures are within normal limits. There is right hemidiaphragm elevation as well as multiple linear bands of the right lower lung zone to suggest linear atelectasis and/or parenchymal scarring. The chronicity of this finding cannot be determined without the benefit of prior imaging for direct comparison. Soft tissues and osseous structures demonstrates degenerative changes involving the left glenohumeral joint.    IMPRESSION:  Right hemidiaphragm elevation with multiple linear opacities of the right lower lung zone as described above. Comparison with remote imaging may be of value to assess for chronicity. HPI:  71 yo M w/ PMHx DM, HTN, HLD, L upper back abscess c/b necrotizing fascitis s/p surgical debridement by Dr. Memo Lancaster, failed split thickness skin grafts (STSG) and prolonged wound care course in 03/2015 now presenting with abscess in same area x 6 days. Patient reports that he began to feel discomfort on 5/22. On 5/23 patient began to have bloody purulent drainage from site and called Dr. Dixon's office for appointment. Patient started on Bactrim 3 days ago without improvement. Of note, patient COVID +ve on 3/30 and was feeling unwell for 3-4weeks. During that time was not paying attention to diet or diabetes management.     Patient seen in office today and referred to ED for admission for surgical debridement 5/28. Medicine consulted for pre-op evaluation    ADDITIONAL ID HPI: patient reports bactrim helped with the pain, however not with purulent drainage. At baseline, he could walk about 5 blocks, however since he got COVID, he has only been able to walk about 0.5-1 block before stopping to catch his breath. Denies any fever, chills, chest pain, palpitations, headache, dizziness, lightheadedness, nausea/vomiting, diarrhea, constipation.     PAST MEDICAL & SURGICAL HISTORY: as per HPI    Home Medications:  Metformin 1000mg qd  Farxiga 10mg   Sitagliptin 100mg   Enalapril 10mg BID  Crestor 10mg qd  Gemfibrozil 600mg BID  Vitamin D3 qd    OTHER MEDICATIONS:  acetaminophen   Tablet .. 650 milliGRAM(s) Oral every 6 hours PRN  atorvastatin 40 milliGRAM(s) Oral at bedtime  dextrose 40% Gel 15 Gram(s) Oral once PRN  dextrose 5%. 1000 milliLiter(s) IV Continuous <Continuous>  dextrose 50% Injectable 12.5 Gram(s) IV Push once  dextrose 50% Injectable 25 Gram(s) IV Push once  dextrose 50% Injectable 25 Gram(s) IV Push once  enalapril 10 milliGRAM(s) Oral two times a day  gemfibrozil 600 milliGRAM(s) Oral two times a day  glucagon  Injectable 1 milliGRAM(s) IntraMuscular once PRN  insulin lispro (HumaLOG) corrective regimen sliding scale   SubCutaneous Before meals and at bedtime    ALLERGIES:  piperacillin/tazobactam IVPB.. 3.375 Gram(s) IV Intermittent every 6 hours    Allergies    No Known Allergies    Intolerances    FAMILY HISTORY: Malignant hyperthermia    Social Hx: former smoker, about 50pack year hx, quit 15 yrs ago    ROS: as per HPI    VITAL SIGNS:  Vital Signs Last 24 Hrs  T(C): 36.9 (27 May 2020 20:56), Max: 37.1 (27 May 2020 16:27)  T(F): 98.4 (27 May 2020 20:56), Max: 98.8 (27 May 2020 19:01)  HR: 86 (27 May 2020 20:56) (86 - 114)  BP: 101/51 (27 May 2020 20:56) (101/51 - 155/77)  BP(mean): --  RR: 18 (27 May 2020 20:56) (18 - 18)  SpO2: 95% (27 May 2020 20:56) (94% - 96%)    PHYSICAL EXAM:  General: morbidly obese, in NAD, lying comfortably in bed  HEENT: normocephalic, atraumatic; PERRL, anicteric sclera; MMM  Neck: supple, no JVD, no thyromegaly, no lymphadenopathy. L posterior neck with approx 3-4 cm abscess w/ necrotic appearing center, with some purulent drainage and underlying scar tissue   Cardiovascular: +S1/S2, RRR, no M/G/R  Respiratory: decreased breath sounds on b/l bases; no wheezing, no rales, no rhonchi  Gastrointestinal: obese, soft, NT/ND; +BSx4, no organomegaly  Extremities: WWP; trace pitting edema, chronic venous stasis changes  Vascular: 2+ radial, DP/PT pulses B/L  Neurological: AAOx3; no focal deficits    LABS:                        12.8   13.24 )-----------( 284      ( 27 May 2020 14:59 )             38.8     05-27    133<L>  |  100  |  21  ----------------------------<  290<H>  4.2   |  19<L>  |  1.06    Ca    9.9      27 May 2020 14:59    TPro  8.0  /  Alb  4.2  /  TBili  0.2  /  DBili  x   /  AST  14  /  ALT  11  /  AlkPhos  96  05-27    PT/INR - ( 27 May 2020 14:59 )   PT: 12.1 sec;   INR: 1.06        PTT - ( 27 May 2020 14:59 )  PTT:36.5 sec    RADIOLOGY & ADDITIONAL STUDIES:  Xray Chest 1 View-PORTABLE IMMEDIATE (05.27.20 @ 15:57)   FINDINGS:   Despite the limitations in the portable technique the cardia mediastinal structures are within normal limits. There is right hemidiaphragm elevation as well as multiple linear bands of the right lower lung zone to suggest linear atelectasis and/or parenchymal scarring. The chronicity of this finding cannot be determined without the benefit of prior imaging for direct comparison. Soft tissues and osseous structures demonstrates degenerative changes involving the left glenohumeral joint.    IMPRESSION:  Right hemidiaphragm elevation with multiple linear opacities of the right lower lung zone as described above. Comparison with remote imaging may be of value to assess for chronicity. HPI:  71 yo M w/ PMHx DM, HTN, HLD, L upper back abscess c/b necrotizing fascitis s/p surgical debridement by Dr. Memo Lancaster, failed split thickness skin grafts (STSG) and prolonged wound care course in 03/2015 now presenting with abscess in same area x 6 days. Patient reports that he began to feel discomfort on 5/22. On 5/23 patient began to have bloody purulent drainage from site and called Dr. Dixon's office for appointment. Patient started on Bactrim 3 days ago without improvement. Of note, patient COVID +ve on 3/30 and was feeling unwell for 3-4weeks. During that time was not paying attention to diet or diabetes management.     Patient seen in office today and referred to ED for admission for surgical debridement 5/28. Medicine consulted for pre-op evaluation    ADDITIONAL ID HPI: patient reports bactrim helped with the pain, however not with purulent drainage. At baseline, he could walk about 5 blocks, however since he got COVID, he has only been able to walk about 0.5-1 block before stopping to catch his breath. Denies any fever, chills, chest pain, palpitations, headache, dizziness, lightheadedness, nausea/vomiting, diarrhea, constipation.     PAST MEDICAL & SURGICAL HISTORY: as per HPI; cataract surgery b/l       Home Medications:  Metformin 1000mg qd  Farxiga 10mg   Sitagliptin 100mg   Enalapril 10mg BID  Crestor 10mg qd  Gemfibrozil 600mg BID  Vitamin D3 qd    OTHER MEDICATIONS:  acetaminophen   Tablet .. 650 milliGRAM(s) Oral every 6 hours PRN  atorvastatin 40 milliGRAM(s) Oral at bedtime  dextrose 40% Gel 15 Gram(s) Oral once PRN  dextrose 5%. 1000 milliLiter(s) IV Continuous <Continuous>  dextrose 50% Injectable 12.5 Gram(s) IV Push once  dextrose 50% Injectable 25 Gram(s) IV Push once  dextrose 50% Injectable 25 Gram(s) IV Push once  enalapril 10 milliGRAM(s) Oral two times a day  gemfibrozil 600 milliGRAM(s) Oral two times a day  glucagon  Injectable 1 milliGRAM(s) IntraMuscular once PRN  insulin lispro (HumaLOG) corrective regimen sliding scale   SubCutaneous Before meals and at bedtime    ALLERGIES:  piperacillin/tazobactam IVPB.. 3.375 Gram(s) IV Intermittent every 6 hours    Allergies    No Known Allergies    Intolerances    FAMILY HISTORY: Malignant hyperthermia in cousin ; no family hx of heart disease in mother or father     Social Hx: former smoker, about 50pack year hx, quit 15 yrs ago    ROS: as per HPI    VITAL SIGNS:  Vital Signs Last 24 Hrs  T(C): 36.9 (27 May 2020 20:56), Max: 37.1 (27 May 2020 16:27)  T(F): 98.4 (27 May 2020 20:56), Max: 98.8 (27 May 2020 19:01)  HR: 86 (27 May 2020 20:56) (86 - 114)  BP: 101/51 (27 May 2020 20:56) (101/51 - 155/77)  BP(mean): --  RR: 18 (27 May 2020 20:56) (18 - 18)  SpO2: 95% (27 May 2020 20:56) (94% - 96%)    PHYSICAL EXAM:  General: morbidly obese, in NAD, lying comfortably in bed  HEENT: normocephalic, atraumatic; PERRL, anicteric sclera; MMM  Neck: supple, no JVD, no thyromegaly, no lymphadenopathy. L posterior neck with approx 3-4 cm abscess w/ necrotic appearing center, with some purulent drainage and underlying scar tissue   Cardiovascular: +S1/S2, RRR, no M/G/R  Respiratory: decreased breath sounds on b/l bases; no wheezing, no rales, no rhonchi  Gastrointestinal: obese, soft, NT/ND; +BSx4, no organomegaly  Extremities: WWP; trace pitting edema, chronic venous stasis changes  Vascular: 2+ radial, DP/PT pulses B/L  Neurological: AAOx3; no focal deficits    LABS:                        12.8   13.24 )-----------( 284      ( 27 May 2020 14:59 )             38.8     05-27    133<L>  |  100  |  21  ----------------------------<  290<H>  4.2   |  19<L>  |  1.06    Ca    9.9      27 May 2020 14:59    TPro  8.0  /  Alb  4.2  /  TBili  0.2  /  DBili  x   /  AST  14  /  ALT  11  /  AlkPhos  96  05-27    PT/INR - ( 27 May 2020 14:59 )   PT: 12.1 sec;   INR: 1.06        PTT - ( 27 May 2020 14:59 )  PTT:36.5 sec    RADIOLOGY & ADDITIONAL STUDIES:  Xray Chest 1 View-PORTABLE IMMEDIATE (05.27.20 @ 15:57)   FINDINGS:   Despite the limitations in the portable technique the cardia mediastinal structures are within normal limits. There is right hemidiaphragm elevation as well as multiple linear bands of the right lower lung zone to suggest linear atelectasis and/or parenchymal scarring. The chronicity of this finding cannot be determined without the benefit of prior imaging for direct comparison. Soft tissues and osseous structures demonstrates degenerative changes involving the left glenohumeral joint.    IMPRESSION:  Right hemidiaphragm elevation with multiple linear opacities of the right lower lung zone as described above. Comparison with remote imaging may be of value to assess for chronicity.

## 2020-05-27 NOTE — ED PROVIDER NOTE - CLINICAL SUMMARY MEDICAL DECISION MAKING FREE TEXT BOX
Pt sent to the ED for admission for OR debridement of neck abscess, recurrent, failing outpt abx. Pre-op labs, EKG, CXR, COVID swab. ENT consult. Anticipate admission

## 2020-05-27 NOTE — ED PROVIDER NOTE - PHYSICAL EXAMINATION
Limited PE performed in the setting of the COVID10 pandemic, in efforts to limit exposure and cross-contamination  Constitutional: Well appearing, well nourished, awake, alert, oriented to person, place, time/situation and in no apparent distress.  ENMT: Airway patent.   Eyes: Clear bilaterally  Cardiac: Normal rate, regular rhythm.   Respiratory: No increased WOB, tachypnea, hypoxia, or accessory mm use. Pt speaks in full sentences.   Gastrointestinal: Abd soft, NT, ND. No guarding, rebound, or rigidity.   Musculoskeletal: Range of motion is not limited  Neuro: Alert and oriented x 3, face symmetric and speech fluent. Nml gross motor movement, grossly non focal   Skin: Skin normal color for race, warm, dry and intact. approx 3-4 cm abscess w/ necrotic appearing center, with some purulent drainage to posterior neck, w/ underlying scar tissue   Psych: Alert and oriented to person, place, time/situation. normal mood and affect. no apparent risk to self or others.

## 2020-05-27 NOTE — CONSULT NOTE ADULT - ATTENDING COMMENTS
patient seen and examined overnight    reviewed pertinent data, h&p, PE as above     pt medically optimized for procedure, no further testing needed; will followup post op ; hold ACEI preop      rest of plan as above patient seen and examined overnight    reviewed pertinent data, h&p, PE as above except with surgical pupils b/l ; abscess at Left posterior neck/ upper back region    pt medically optimized for procedure, no further testing needed; will followup post op ; hold ACEI preop;  informed ent of x of malignant hyperthermia       rest of plan as above patient seen and examined overnight    reviewed pertinent data, h&p, PE as above except with surgical pupils b/l ; abscess at Left posterior neck/ upper back region    pt medically optimized for procedure, no further testing needed; will followup post op ; hold ACEI preop;  informed ent of fhx of malignant hyperthermia ; CXR results maybe from previous COVID infection, need previous CXR to compare if old or new, further workup such as CT chest imaging to be done as  outpatient unless patient has respiratory sxs while inpatient.        rest of plan as above

## 2020-05-28 ENCOUNTER — RESULT REVIEW (OUTPATIENT)
Age: 70
End: 2020-05-28

## 2020-05-28 LAB
BLD GP AB SCN SERPL QL: NEGATIVE — SIGNIFICANT CHANGE UP
GLUCOSE BLDC GLUCOMTR-MCNC: 171 MG/DL — HIGH (ref 70–99)
GLUCOSE BLDC GLUCOMTR-MCNC: 196 MG/DL — HIGH (ref 70–99)
GLUCOSE BLDC GLUCOMTR-MCNC: 219 MG/DL — HIGH (ref 70–99)
GLUCOSE BLDC GLUCOMTR-MCNC: 265 MG/DL — HIGH (ref 70–99)
HCT VFR BLD CALC: 35.6 % — LOW (ref 39–50)
HCV AB S/CO SERPL IA: 0.12 S/CO — SIGNIFICANT CHANGE UP
HCV AB SERPL-IMP: SIGNIFICANT CHANGE UP
HGB BLD-MCNC: 11.6 G/DL — LOW (ref 13–17)
MCHC RBC-ENTMCNC: 31 PG — SIGNIFICANT CHANGE UP (ref 27–34)
MCHC RBC-ENTMCNC: 32.6 GM/DL — SIGNIFICANT CHANGE UP (ref 32–36)
MCV RBC AUTO: 95.2 FL — SIGNIFICANT CHANGE UP (ref 80–100)
NRBC # BLD: 0 /100 WBCS — SIGNIFICANT CHANGE UP (ref 0–0)
PLATELET # BLD AUTO: 233 K/UL — SIGNIFICANT CHANGE UP (ref 150–400)
RBC # BLD: 3.74 M/UL — LOW (ref 4.2–5.8)
RBC # FLD: 13.9 % — SIGNIFICANT CHANGE UP (ref 10.3–14.5)
RH IG SCN BLD-IMP: POSITIVE — SIGNIFICANT CHANGE UP
WBC # BLD: 10.39 K/UL — SIGNIFICANT CHANGE UP (ref 3.8–10.5)
WBC # FLD AUTO: 10.39 K/UL — SIGNIFICANT CHANGE UP (ref 3.8–10.5)

## 2020-05-28 PROCEDURE — 70491 CT SOFT TISSUE NECK W/DYE: CPT | Mod: 26

## 2020-05-28 PROCEDURE — 88304 TISSUE EXAM BY PATHOLOGIST: CPT | Mod: 26

## 2020-05-28 RX ORDER — DIPHENHYDRAMINE HCL 50 MG
50 CAPSULE ORAL AT BEDTIME
Refills: 0 | Status: DISCONTINUED | OUTPATIENT
Start: 2020-05-28 | End: 2020-05-31

## 2020-05-28 RX ORDER — OXYCODONE HYDROCHLORIDE 5 MG/1
5 TABLET ORAL EVERY 4 HOURS
Refills: 0 | Status: DISCONTINUED | OUTPATIENT
Start: 2020-05-28 | End: 2020-05-31

## 2020-05-28 RX ORDER — ONDANSETRON 8 MG/1
4 TABLET, FILM COATED ORAL EVERY 8 HOURS
Refills: 0 | Status: DISCONTINUED | OUTPATIENT
Start: 2020-05-28 | End: 2020-05-31

## 2020-05-28 RX ADMIN — Medication 2: at 12:21

## 2020-05-28 RX ADMIN — SODIUM CHLORIDE 75 MILLILITER(S): 9 INJECTION, SOLUTION INTRAVENOUS at 00:01

## 2020-05-28 RX ADMIN — ATORVASTATIN CALCIUM 40 MILLIGRAM(S): 80 TABLET, FILM COATED ORAL at 21:33

## 2020-05-28 RX ADMIN — Medication 6: at 07:51

## 2020-05-28 RX ADMIN — Medication 600 MILLIGRAM(S): at 06:49

## 2020-05-28 RX ADMIN — Medication: at 18:55

## 2020-05-28 RX ADMIN — PIPERACILLIN AND TAZOBACTAM 200 GRAM(S): 4; .5 INJECTION, POWDER, LYOPHILIZED, FOR SOLUTION INTRAVENOUS at 18:49

## 2020-05-28 RX ADMIN — PIPERACILLIN AND TAZOBACTAM 200 GRAM(S): 4; .5 INJECTION, POWDER, LYOPHILIZED, FOR SOLUTION INTRAVENOUS at 23:36

## 2020-05-28 RX ADMIN — Medication 4: at 22:04

## 2020-05-28 RX ADMIN — PIPERACILLIN AND TAZOBACTAM 200 GRAM(S): 4; .5 INJECTION, POWDER, LYOPHILIZED, FOR SOLUTION INTRAVENOUS at 12:21

## 2020-05-28 RX ADMIN — PIPERACILLIN AND TAZOBACTAM 200 GRAM(S): 4; .5 INJECTION, POWDER, LYOPHILIZED, FOR SOLUTION INTRAVENOUS at 07:21

## 2020-05-28 RX ADMIN — Medication 250 MILLIGRAM(S): at 05:06

## 2020-05-28 NOTE — PROGRESS NOTE ADULT - SUBJECTIVE AND OBJECTIVE BOX
STATUS POST:  70y Male     SUBJECTIVE: Patient seen and examined bedside by chief resident.    Vital Signs Last 24 Hrs  T(C): 36.4 (28 May 2020 08:58), Max: 37.1 (27 May 2020 16:27)  T(F): 97.5 (28 May 2020 08:58), Max: 98.8 (27 May 2020 19:01)  HR: 82 (28 May 2020 08:58) (77 - 114)  BP: 103/68 (28 May 2020 08:58) (101/51 - 155/77)  BP(mean): --  RR: 18 (28 May 2020 08:58) (18 - 19)  SpO2: 95% (28 May 2020 08:58) (94% - 96%)    MEDICATIONS  (STANDING):  atorvastatin 40 milliGRAM(s) Oral at bedtime  dextrose 5%. 1000 milliLiter(s) (50 mL/Hr) IV Continuous <Continuous>  dextrose 50% Injectable 12.5 Gram(s) IV Push once  dextrose 50% Injectable 25 Gram(s) IV Push once  dextrose 50% Injectable 25 Gram(s) IV Push once  gemfibrozil 600 milliGRAM(s) Oral two times a day  insulin lispro (HumaLOG) corrective regimen sliding scale   SubCutaneous Before meals and at bedtime  lactated ringers. 1000 milliLiter(s) (75 mL/Hr) IV Continuous <Continuous>  piperacillin/tazobactam IVPB.. 3.375 Gram(s) IV Intermittent every 6 hours  vancomycin  IVPB 2000 milliGRAM(s) IV Intermittent every 12 hours      General: AAOx3, resting in bed, comfortable  C/V: NSR RRR  Pulm: Nonlabored breathing, no respiratory distress  Abd: SNT  Extrem: WWP, no edema, SCDs in place  HEENT: incision c/d/i, AMILCAR ( )      05-27-20 @ 07:01  -  05-28-20 @ 07:00  --------------------------------------------------------  IN: 950 mL / OUT: 600 mL / NET: 350 mL                              11.6   10.39 )-----------( 233      ( 28 May 2020 06:04 )             35.6     05-27    133<L>  |  100  |  21  ----------------------------<  290<H>  4.2   |  19<L>  |  1.06    Ca    9.9      27 May 2020 14:59    TPro  8.0  /  Alb  4.2  /  TBili  0.2  /  DBili  x   /  AST  14  /  ALT  11  /  AlkPhos  96  05-27    LIVER FUNCTIONS - ( 27 May 2020 14:59 )  Alb: 4.2 g/dL / Pro: 8.0 g/dL / ALK PHOS: 96 U/L / ALT: 11 U/L / AST: 14 U/L / GGT: x           PT/INR - ( 27 May 2020 14:59 )   PT: 12.1 sec;   INR: 1.06          PTT - ( 27 May 2020 14:59 )  PTT:36.5 sec    A/P:     ----------------------------------------------  Toshia Kuo MD  Resident  Department of Otolaryngology - Head and Neck Surgery  [facility] STATUS POST:  70M w/PMHx DM, HTN, HLD, L upper back abscess c/b nec fascitis s/p surgical debridement by Dr. Memo Lancaster, failed STSG and prolonged wound care course in 03/2015 now presenting with abscess in same area on 5/27     SUBJECTIVE: Patient seen and examined bedside by chief resident. Patient reports improved pain since starting antibiotics. Patient for OR today.     Vital Signs Last 24 Hrs  T(C): 36.4 (28 May 2020 08:58), Max: 37.1 (27 May 2020 16:27)  T(F): 97.5 (28 May 2020 08:58), Max: 98.8 (27 May 2020 19:01)  HR: 82 (28 May 2020 08:58) (77 - 114)  BP: 103/68 (28 May 2020 08:58) (101/51 - 155/77)  BP(mean): --  RR: 18 (28 May 2020 08:58) (18 - 19)  SpO2: 95% (28 May 2020 08:58) (94% - 96%)    MEDICATIONS  (STANDING):  atorvastatin 40 milliGRAM(s) Oral at bedtime  dextrose 5%. 1000 milliLiter(s) (50 mL/Hr) IV Continuous <Continuous>  dextrose 50% Injectable 12.5 Gram(s) IV Push once  dextrose 50% Injectable 25 Gram(s) IV Push once  dextrose 50% Injectable 25 Gram(s) IV Push once  gemfibrozil 600 milliGRAM(s) Oral two times a day  insulin lispro (HumaLOG) corrective regimen sliding scale   SubCutaneous Before meals and at bedtime  lactated ringers. 1000 milliLiter(s) (75 mL/Hr) IV Continuous <Continuous>  piperacillin/tazobactam IVPB.. 3.375 Gram(s) IV Intermittent every 6 hours  vancomycin  IVPB 2000 milliGRAM(s) IV Intermittent every 12 hours    PE  Obese  Back: erythematous scar tissue over left upper back, at superior edge at base of neck tender bullae present draining pus      05-27-20 @ 07:01  -  05-28-20 @ 07:00  --------------------------------------------------------  IN: 950 mL / OUT: 600 mL / NET: 350 mL                              11.6   10.39 )-----------( 233      ( 28 May 2020 06:04 )             35.6     05-27    133<L>  |  100  |  21  ----------------------------<  290<H>  4.2   |  19<L>  |  1.06    Ca    9.9      27 May 2020 14:59    TPro  8.0  /  Alb  4.2  /  TBili  0.2  /  DBili  x   /  AST  14  /  ALT  11  /  AlkPhos  96  05-27    LIVER FUNCTIONS - ( 27 May 2020 14:59 )  Alb: 4.2 g/dL / Pro: 8.0 g/dL / ALK PHOS: 96 U/L / ALT: 11 U/L / AST: 14 U/L / GGT: x           PT/INR - ( 27 May 2020 14:59 )   PT: 12.1 sec;   INR: 1.06          PTT - ( 27 May 2020 14:59 )  PTT:36.5 sec    70M w/PMHx DM, HTN, HLD, L upper back abscess c/b nec fascitis s/p surgical debridement by Dr. Memo Lancaster, failed STSG and prolonged wound care course in 03/2015 now presenting with abscess in same area. Patient for surgical debridement 5/28.   -NPO/IVF   -Vanc/Zosyn   -VT  -f/u Neck CT  -f/u wound cx   -Patient medically cleared for OR  -home meds   -trend   -[facility] recs     ----------------------------------------------  Toshia Kuo MD  Resident  Department of Otolaryngology - Head and Neck Surgery  [facility]

## 2020-05-28 NOTE — PROGRESS NOTE ADULT - SUBJECTIVE AND OBJECTIVE BOX
Post-Op Check     Pt s/p debridement of left posterior neck abscess. Ulcerative area of tissue sent for cultures/path introp. No complications. EBL 5 cc,  cc.     Vital Signs Last 24 Hrs  T(C): 36.9 (28 May 2020 14:46), Max: 37.1 (27 May 2020 19:01)  T(F): 98.5 (28 May 2020 14:46), Max: 98.8 (27 May 2020 19:01)  HR: 78 (28 May 2020 14:46) (77 - 86)  BP: 143/78 (28 May 2020 14:46) (101/51 - 143/78)  BP(mean): --  RR: 18 (28 May 2020 14:46) (18 - 19)  SpO2: 94% (28 May 2020 14:46) (94% - 96%)    PE:   Gen: NAD   Head: NC/AT   Eyes: EOMI, PERRLA  Nose: Clear anteriorly   OC/OP: clear   Neck: Gauze + Bacitracin coated adaptik dressing in place overlying left neck wound. No evidence of bleeding.   Resp: Breathing comfortably on RA       70M w/PMHx DM, HTN, HLD, L upper back abscess c/b nec fascitis s/p surgical debridement by Dr. Memo Lancaster, failed STSG and prolonged wound care course in 03/2015 now presenting with abscess in same area. Now s/p debridement of left neck wound 5/28.   -Continue IV Abx (Vanc/Zosyn)   -F/u Cultures/Path   -Pain control   -Kosher diet   -Daily dressing changes   -Trend fever, CBC   -Please page ENT w/ any additional questions/concerns

## 2020-05-29 ENCOUNTER — TRANSCRIPTION ENCOUNTER (OUTPATIENT)
Age: 70
End: 2020-05-29

## 2020-05-29 LAB
-  ERYTHROMYCIN: SIGNIFICANT CHANGE UP
-  GENTAMICIN: SIGNIFICANT CHANGE UP
-  PENICILLIN: SIGNIFICANT CHANGE UP
-  VANCOMYCIN: SIGNIFICANT CHANGE UP
ANION GAP SERPL CALC-SCNC: 14 MMOL/L — SIGNIFICANT CHANGE UP (ref 5–17)
BASOPHILS # BLD AUTO: 0.05 K/UL — SIGNIFICANT CHANGE UP (ref 0–0.2)
BASOPHILS NFR BLD AUTO: 0.5 % — SIGNIFICANT CHANGE UP (ref 0–2)
BUN SERPL-MCNC: 26 MG/DL — HIGH (ref 7–23)
CALCIUM SERPL-MCNC: 9.8 MG/DL — SIGNIFICANT CHANGE UP (ref 8.4–10.5)
CHLORIDE SERPL-SCNC: 103 MMOL/L — SIGNIFICANT CHANGE UP (ref 96–108)
CO2 SERPL-SCNC: 23 MMOL/L — SIGNIFICANT CHANGE UP (ref 22–31)
CREAT SERPL-MCNC: 1.55 MG/DL — HIGH (ref 0.5–1.3)
CULTURE RESULTS: SIGNIFICANT CHANGE UP
EOSINOPHIL # BLD AUTO: 0.44 K/UL — SIGNIFICANT CHANGE UP (ref 0–0.5)
EOSINOPHIL NFR BLD AUTO: 4.2 % — SIGNIFICANT CHANGE UP (ref 0–6)
GLUCOSE BLDC GLUCOMTR-MCNC: 229 MG/DL — HIGH (ref 70–99)
GLUCOSE BLDC GLUCOMTR-MCNC: 241 MG/DL — HIGH (ref 70–99)
GLUCOSE BLDC GLUCOMTR-MCNC: 298 MG/DL — HIGH (ref 70–99)
GLUCOSE BLDC GLUCOMTR-MCNC: 364 MG/DL — HIGH (ref 70–99)
GLUCOSE SERPL-MCNC: 259 MG/DL — HIGH (ref 70–99)
GRAM STN FLD: SIGNIFICANT CHANGE UP
HCT VFR BLD CALC: 36.6 % — LOW (ref 39–50)
HGB BLD-MCNC: 11.6 G/DL — LOW (ref 13–17)
IMM GRANULOCYTES NFR BLD AUTO: 0.5 % — SIGNIFICANT CHANGE UP (ref 0–1.5)
LYMPHOCYTES # BLD AUTO: 1.63 K/UL — SIGNIFICANT CHANGE UP (ref 1–3.3)
LYMPHOCYTES # BLD AUTO: 15.4 % — SIGNIFICANT CHANGE UP (ref 13–44)
MCHC RBC-ENTMCNC: 30.7 PG — SIGNIFICANT CHANGE UP (ref 27–34)
MCHC RBC-ENTMCNC: 31.7 GM/DL — LOW (ref 32–36)
MCV RBC AUTO: 96.8 FL — SIGNIFICANT CHANGE UP (ref 80–100)
METHOD TYPE: SIGNIFICANT CHANGE UP
MONOCYTES # BLD AUTO: 1 K/UL — HIGH (ref 0–0.9)
MONOCYTES NFR BLD AUTO: 9.5 % — SIGNIFICANT CHANGE UP (ref 2–14)
NEUTROPHILS # BLD AUTO: 7.39 K/UL — SIGNIFICANT CHANGE UP (ref 1.8–7.4)
NEUTROPHILS NFR BLD AUTO: 69.9 % — SIGNIFICANT CHANGE UP (ref 43–77)
NRBC # BLD: 0 /100 WBCS — SIGNIFICANT CHANGE UP (ref 0–0)
ORGANISM # SPEC MICROSCOPIC CNT: SIGNIFICANT CHANGE UP
ORGANISM # SPEC MICROSCOPIC CNT: SIGNIFICANT CHANGE UP
PLATELET # BLD AUTO: 240 K/UL — SIGNIFICANT CHANGE UP (ref 150–400)
POTASSIUM SERPL-MCNC: 4.7 MMOL/L — SIGNIFICANT CHANGE UP (ref 3.5–5.3)
POTASSIUM SERPL-SCNC: 4.7 MMOL/L — SIGNIFICANT CHANGE UP (ref 3.5–5.3)
RBC # BLD: 3.78 M/UL — LOW (ref 4.2–5.8)
RBC # FLD: 13.8 % — SIGNIFICANT CHANGE UP (ref 10.3–14.5)
SODIUM SERPL-SCNC: 140 MMOL/L — SIGNIFICANT CHANGE UP (ref 135–145)
SPECIMEN SOURCE: SIGNIFICANT CHANGE UP
SPECIMEN SOURCE: SIGNIFICANT CHANGE UP
VANCOMYCIN TROUGH SERPL-MCNC: 23 UG/ML — HIGH (ref 10–20)
WBC # BLD: 10.56 K/UL — HIGH (ref 3.8–10.5)
WBC # FLD AUTO: 10.56 K/UL — HIGH (ref 3.8–10.5)

## 2020-05-29 PROCEDURE — 99233 SBSQ HOSP IP/OBS HIGH 50: CPT | Mod: GC

## 2020-05-29 RX ORDER — INSULIN LISPRO 100/ML
3 VIAL (ML) SUBCUTANEOUS EVERY 8 HOURS
Refills: 0 | Status: DISCONTINUED | OUTPATIENT
Start: 2020-05-29 | End: 2020-05-29

## 2020-05-29 RX ORDER — HEPARIN SODIUM 5000 [USP'U]/ML
7500 INJECTION INTRAVENOUS; SUBCUTANEOUS EVERY 8 HOURS
Refills: 0 | Status: DISCONTINUED | OUTPATIENT
Start: 2020-05-29 | End: 2020-05-31

## 2020-05-29 RX ORDER — GEMFIBROZIL 600 MG
1 TABLET ORAL
Qty: 0 | Refills: 0 | DISCHARGE
Start: 2020-05-29

## 2020-05-29 RX ORDER — INSULIN LISPRO 100/ML
3 VIAL (ML) SUBCUTANEOUS
Refills: 0 | Status: DISCONTINUED | OUTPATIENT
Start: 2020-05-29 | End: 2020-05-30

## 2020-05-29 RX ORDER — INSULIN GLARGINE 100 [IU]/ML
10 INJECTION, SOLUTION SUBCUTANEOUS AT BEDTIME
Refills: 0 | Status: DISCONTINUED | OUTPATIENT
Start: 2020-05-29 | End: 2020-05-30

## 2020-05-29 RX ORDER — HEPARIN SODIUM 5000 [USP'U]/ML
5000 INJECTION INTRAVENOUS; SUBCUTANEOUS EVERY 8 HOURS
Refills: 0 | Status: DISCONTINUED | OUTPATIENT
Start: 2020-05-29 | End: 2020-05-29

## 2020-05-29 RX ORDER — VANCOMYCIN HCL 1 G
2000 VIAL (EA) INTRAVENOUS EVERY 12 HOURS
Refills: 0 | Status: DISCONTINUED | OUTPATIENT
Start: 2020-05-29 | End: 2020-05-29

## 2020-05-29 RX ORDER — ATORVASTATIN CALCIUM 80 MG/1
1 TABLET, FILM COATED ORAL
Qty: 0 | Refills: 0 | DISCHARGE
Start: 2020-05-29

## 2020-05-29 RX ORDER — BACITRACIN ZINC 500 UNIT/G
1 OINTMENT IN PACKET (EA) TOPICAL EVERY 6 HOURS
Refills: 0 | Status: DISCONTINUED | OUTPATIENT
Start: 2020-05-29 | End: 2020-05-31

## 2020-05-29 RX ORDER — CEFAZOLIN SODIUM 1 G
1000 VIAL (EA) INJECTION EVERY 8 HOURS
Refills: 0 | Status: DISCONTINUED | OUTPATIENT
Start: 2020-05-29 | End: 2020-05-29

## 2020-05-29 RX ADMIN — HEPARIN SODIUM 7500 UNIT(S): 5000 INJECTION INTRAVENOUS; SUBCUTANEOUS at 14:30

## 2020-05-29 RX ADMIN — Medication 1 APPLICATION(S): at 12:38

## 2020-05-29 RX ADMIN — Medication 3 UNIT(S): at 21:22

## 2020-05-29 RX ADMIN — Medication 4: at 08:53

## 2020-05-29 RX ADMIN — Medication 10 MILLIGRAM(S): at 05:56

## 2020-05-29 RX ADMIN — ATORVASTATIN CALCIUM 40 MILLIGRAM(S): 80 TABLET, FILM COATED ORAL at 21:22

## 2020-05-29 RX ADMIN — Medication 6: at 12:00

## 2020-05-29 RX ADMIN — PIPERACILLIN AND TAZOBACTAM 200 GRAM(S): 4; .5 INJECTION, POWDER, LYOPHILIZED, FOR SOLUTION INTRAVENOUS at 06:04

## 2020-05-29 RX ADMIN — Medication 1 APPLICATION(S): at 18:41

## 2020-05-29 RX ADMIN — Medication 600 MILLIGRAM(S): at 05:57

## 2020-05-29 RX ADMIN — Medication 4: at 17:12

## 2020-05-29 RX ADMIN — HEPARIN SODIUM 7500 UNIT(S): 5000 INJECTION INTRAVENOUS; SUBCUTANEOUS at 21:21

## 2020-05-29 RX ADMIN — Medication 250 MILLIGRAM(S): at 03:13

## 2020-05-29 RX ADMIN — Medication 10 MILLIGRAM(S): at 18:41

## 2020-05-29 RX ADMIN — INSULIN GLARGINE 10 UNIT(S): 100 INJECTION, SOLUTION SUBCUTANEOUS at 21:23

## 2020-05-29 RX ADMIN — Medication 10: at 21:22

## 2020-05-29 RX ADMIN — Medication 600 MILLIGRAM(S): at 18:42

## 2020-05-29 NOTE — DISCHARGE NOTE PROVIDER - HOSPITAL COURSE
Wound care instructions:         Change dressing 2x/day.     Dressing: ADAPTIC covering in bacitracin applied to wound bed. Wound then covered with gauze. 5/28: Pt s/p debridement of left posterior neck abscess. Ulcerative area of tissue sent for cultures/path introp. No complications. EBL 5 cc,  cc.     5/29: NAEON, pt remains afebrile, HD stable. Small amount sebaceous debris expressed at bedside this AM.     5/30: NAEON remains afebrile. Dressing changed. Sensitivity to linezolid identified. Vancomycin associated ASHLEY resolved.     5/31: NAEON, remains afebrile. Dressing changed.     MEDICATIONS  (STANDING):    atorvastatin 40 milliGRAM(s) Oral at bedtime    BACItracin   Ointment 1 Application(s) Topical every 6 hours    dextrose 5%. 1000 milliLiter(s) (50 mL/Hr) IV Continuous <Continuous>    dextrose 50% Injectable 12.5 Gram(s) IV Push once    dextrose 50% Injectable 25 Gram(s) IV Push once    dextrose 50% Injectable 25 Gram(s) IV Push once    enalapril 10 milliGRAM(s) Oral every 12 hours    gemfibrozil 600 milliGRAM(s) Oral two times a day    heparin   Injectable 7500 Unit(s) SubCutaneous every 8 hours    insulin glargine Injectable (LANTUS) 16 Unit(s) SubCutaneous at bedtime    insulin lispro (HumaLOG) corrective regimen sliding scale   SubCutaneous Before meals and at bedtime    insulin lispro Injectable (HumaLOG) 6 Unit(s) SubCutaneous three times a day before meals        MEDICATIONS  (PRN):    acetaminophen   Tablet .. 650 milliGRAM(s) Oral every 6 hours PRN Mild Pain (1 - 3), Moderate Pain (4 - 6)    dextrose 40% Gel 15 Gram(s) Oral once PRN Blood Glucose LESS THAN 70 milliGRAM(s)/deciliter    diphenhydrAMINE 50 milliGRAM(s) Oral at bedtime PRN Insomnia    glucagon  Injectable 1 milliGRAM(s) IntraMuscular once PRN Glucose LESS THAN 70 milligrams/deciliter    ondansetron Injectable 4 milliGRAM(s) IV Push every 8 hours PRN Nausea and/or Vomiting    oxyCODONE    IR 5 milliGRAM(s) Oral every 4 hours PRN Severe Pain (7 - 10)        Vital Signs Last 24 Hrs    T(C): 36.3 (31 May 2020 08:30), Max: 36.8 (30 May 2020 16:42)    T(F): 97.3 (31 May 2020 08:30), Max: 98.3 (30 May 2020 16:42)    HR: 82 (31 May 2020 08:30) (76 - 82)    BP: 139/75 (31 May 2020 08:30) (124/71 - 153/81)    BP(mean): --    RR: 16 (31 May 2020 08:30) (16 - 18)    SpO2: 95% (31 May 2020 08:30) (94% - 95%)        PE:     Gen: NAD     Head: NC/AT     Eyes: EOMI, PERRLA    Nose: Clear anteriorly     OC/OP: clear     Neck: Gauze + Bacitracin coated adaptic dressing in place overlying left neck wound. No evidence of bleeding.     Resp: Breathing comfortably on RA                          5/27:     Micro: C. striatum, sens. vanc, linezolid     BCx: NGTD     5/29:     Tissue Cx: C. Striatum, rare staph hominis     Path:  pending            70M w/PMHx DM, HTN, HLD, L upper back abscess c/b nec fascitis s/p surgical debridement by Dr. Memo Lancaster, failed STSG and prolonged wound care course in 03/2015 now presenting with abscess in same area. Now s/p debridement of left neck wound 5/28.     -Linezolid 600 mg BID, x 11 days     -F/u Cultures/Path     -Follow up with primary care physician regarding hemidiaphragm elevation and normocytic anemia work up     -Please page ENT w/ any additional questions/concerns               Wound care instructions:         Change dressing 2x/day.     Dressing: ADAPTIC covering in bacitracin applied to wound bed. Wound then covered with gauze.

## 2020-05-29 NOTE — DISCHARGE NOTE PROVIDER - CARE PROVIDER_API CALL
Kamari Dixon)  Otolaryngology  [address] [place] [address]  Phone: [phone]  Fax: [phone]  Follow Up Time:

## 2020-05-29 NOTE — DISCHARGE NOTE PROVIDER - NSDCMRMEDTOKEN_GEN_ALL_CORE_FT
atorvastatin 40 mg oral tablet: 1 tab(s) orally once a day (at bedtime)  enalapril 10 mg oral tablet: 1 tab(s) orally every 12 hours  gemfibrozil 600 mg oral tablet: 1 tab(s) orally 2 times a day atorvastatin 40 mg oral tablet: 1 tab(s) orally once a day (at bedtime)  enalapril 10 mg oral tablet: 1 tab(s) orally every 12 hours  gemfibrozil 600 mg oral tablet: 1 tab(s) orally 2 times a day  linezolid 600 mg oral tablet: 1 tab(s) orally every 12 hours

## 2020-05-29 NOTE — PROGRESS NOTE ADULT - SUBJECTIVE AND OBJECTIVE BOX
INTERVAL HPI/OVERNIGHT EVENTS: ADELA o/n. No new complaints. Tolerating his diet this AM. No shortness of breath or chest pain.     VITAL SIGNS:  T(F): 97.5 (05-29-20 @ 09:15)  HR: 71 (05-29-20 @ 09:15)  BP: 130/82 (05-29-20 @ 09:15)  RR: 18 (05-29-20 @ 09:15)  SpO2: 100% (05-29-20 @ 09:15)  Wt(kg): --    PHYSICAL EXAM:      Constitutional: NAD, well-groomed, well-developed  HEENT: PERRLA, EOMI, Normal Hearing, MMM  Neck: No LAD, No JVD  Back: Normal spine flexure, No CVA tenderness  Respiratory: CTAB  Cardiovascular: S1 and S2, RRR, no M/G/R  Gastrointestinal: BS+, soft, NT/ND. Obese   Extremities: symmetric b/l extremity edema  Vascular: 2+ peripheral pulses  Neurological: A/O x 3, no focal deficits  Psychiatric: Normal mood, normal affect  Musculoskeletal: 5/5 strength b/l upper and lower extremities  Skin: chronic venous dermatitis changes b/l lower extremities         MEDICATIONS  (STANDING):  atorvastatin 40 milliGRAM(s) Oral at bedtime  BACItracin   Ointment 1 Application(s) Topical every 6 hours  dextrose 5%. 1000 milliLiter(s) (50 mL/Hr) IV Continuous <Continuous>  dextrose 50% Injectable 12.5 Gram(s) IV Push once  dextrose 50% Injectable 25 Gram(s) IV Push once  dextrose 50% Injectable 25 Gram(s) IV Push once  enalapril 10 milliGRAM(s) Oral every 12 hours  gemfibrozil 600 milliGRAM(s) Oral two times a day  heparin   Injectable 7500 Unit(s) SubCutaneous every 8 hours  insulin lispro (HumaLOG) corrective regimen sliding scale   SubCutaneous Before meals and at bedtime  vancomycin  IVPB 2000 milliGRAM(s) IV Intermittent every 12 hours    MEDICATIONS  (PRN):  acetaminophen   Tablet .. 650 milliGRAM(s) Oral every 6 hours PRN Mild Pain (1 - 3), Moderate Pain (4 - 6)  dextrose 40% Gel 15 Gram(s) Oral once PRN Blood Glucose LESS THAN 70 milliGRAM(s)/deciliter  diphenhydrAMINE 50 milliGRAM(s) Oral at bedtime PRN Insomnia  glucagon  Injectable 1 milliGRAM(s) IntraMuscular once PRN Glucose LESS THAN 70 milligrams/deciliter  ondansetron Injectable 4 milliGRAM(s) IV Push every 8 hours PRN Nausea and/or Vomiting  oxyCODONE    IR 5 milliGRAM(s) Oral every 4 hours PRN Severe Pain (7 - 10)      Allergies    No Known Allergies    Intolerances        LABS:                        11.6   10.56 )-----------( 240      ( 29 May 2020 06:33 )             36.6     05-27    133<L>  |  100  |  21  ----------------------------<  290<H>  4.2   |  19<L>  |  1.06    Ca    9.9      27 May 2020 14:59    TPro  8.0  /  Alb  4.2  /  TBili  0.2  /  DBili  x   /  AST  14  /  ALT  11  /  AlkPhos  96  05-27    PT/INR - ( 27 May 2020 14:59 )   PT: 12.1 sec;   INR: 1.06          PTT - ( 27 May 2020 14:59 )  PTT:36.5 sec      RADIOLOGY & ADDITIONAL TESTS:

## 2020-05-29 NOTE — PROGRESS NOTE ADULT - ASSESSMENT
69 yo M w/ PMH of DM, HTN, HLD, L upper back abscess c/b necrotizing fascitis s/p surgical debridement by Dr. Memo Lancaster, failed split thickness skin grafts (STSG) and prolonged wound care course in 03/2015 now presenting with abscess in same area x 6 days, not responding to 3 days course of bactrim. Patient admitted for surgical debridement on 5/28. Medicine consulted for pre-op evaluation    #Pre-op evaluation  - Patient is able to perform >4 METS, RCRI class 1 risk, Bolton score of .1%. NSQIP 7.0% (below average) risk for serious complication, 8.0% (below average) risk for any complication  - EKG w/ NSR (HR 95), no ischemic changes  - CXR: no acute infiltrates  - patient is low risk for low risk procedure  - Patient is medically optimized for procedure    #L posterior upper back abscess  - Tissue cx growing corynebacterium resistant to penicillin   - agree with continuing vancomycin   - Micro biology called; to plate against linezolid, cipro and bactrim     #Sepsis  resolved     #COVID +ve   Patient tested positive on 3/30 and was feeling unwell for 3-4weeks. Currently asymptomatic, repeat swab on 5/27 negative  - will not treat COVID PNA at this time  - c/w current management    #DM  On home Metformin 1000mg BID, Farxiga 10mg and Sitagliptin 100mg qd  -A1c 8.9  -monitor FSG  - Moderate dose sliding scale   - recommend Lantus 10 U QHS, Lispro 3 U TID before meals     #HTN  - c/w Enalapril 10mg BID    #HLD  - c/w Gemfibrozil 600mg BID  - c/w Crestor 10mg qd    #Anemia  Normocytic anemia, unclear baseline Hgb. Patient presenting with Hgb 12.8, no s/s of bleeding, no reported melena, hematochezia, hematemesis  - obtain Fe studies, TSH, B12/Folate  - monitor CBC  - maintain active T&S  - transfuse for Hgb <7    #R hemidiaphragm elevation  Seen on CXR, as well as multiple linear bands of the right lower lung zone to suggest linear atelectasis and/or parenchymal scarring  - currently patient without respiratory complaints  - please obtain collateral from PMD for prior imaging and need for follow up as outpatient  - consider CT chest if patient with respiratory symptoms

## 2020-05-29 NOTE — PROGRESS NOTE ADULT - SUBJECTIVE AND OBJECTIVE BOX
5/28: Pt s/p debridement of left posterior neck abscess. Ulcerative area of tissue sent for cultures/path introp. No complications. EBL 5 cc,  cc.   5/29: NAEON, pt remains afebrile, HD stable. Small amount sebaceous debris expressed at bedside this AM.     Vital Signs Last 24 Hrs  T(C): 36.3 (29 May 2020 06:02), Max: 36.9 (28 May 2020 14:27)  T(F): 97.4 (29 May 2020 06:02), Max: 98.5 (28 May 2020 14:27)  HR: 82 (29 May 2020 06:02) (78 - 89)  BP: 137/73 (29 May 2020 06:02) (103/68 - 155/66)  BP(mean): 92 (28 May 2020 18:30) (92 - 97)  RR: 17 (29 May 2020 06:02) (14 - 18)  SpO2: 94% (29 May 2020 06:02) (94% - 100%)    PE:   Gen: NAD   Head: NC/AT   Eyes: EOMI, PERRLA  Nose: Clear anteriorly   OC/OP: clear   Neck: Gauze + Bacitracin coated adaptik dressing in place overlying left neck wound. No evidence of bleeding.   Resp: Breathing comfortably on RA     Labs:                         11.6   10.56 )-----------( 240      ( 29 May 2020 06:33 )             36.6     5/27:   Micro: C. striatum, sens. pending   BCx: NGTD   5/29:   Tissue Cx:   Path:        70M w/PMHx DM, HTN, HLD, L upper back abscess c/b nec fascitis s/p surgical debridement by Dr. Memo Lancaster, failed STSG and prolonged wound care course in 03/2015 now presenting with abscess in same area. Now s/p debridement of left neck wound 5/28.   -Continue IV Abx (Vanc/Zosyn)   -F/u Cultures/Path   -Pain control   -Kosher diet   -Daily dressing changes   -Trend fever, CBC   -Please page ENT w/ any additional questions/concerns

## 2020-05-30 LAB
-  CIPROFLOXACIN: SIGNIFICANT CHANGE UP
-  TRIMETHOPRIM/SULFAMETHOXAZOLE: SIGNIFICANT CHANGE UP
ANION GAP SERPL CALC-SCNC: 13 MMOL/L — SIGNIFICANT CHANGE UP (ref 5–17)
BASOPHILS # BLD AUTO: 0.05 K/UL — SIGNIFICANT CHANGE UP (ref 0–0.2)
BASOPHILS NFR BLD AUTO: 0.6 % — SIGNIFICANT CHANGE UP (ref 0–2)
BUN SERPL-MCNC: 26 MG/DL — HIGH (ref 7–23)
CALCIUM SERPL-MCNC: 9.8 MG/DL — SIGNIFICANT CHANGE UP (ref 8.4–10.5)
CHLORIDE SERPL-SCNC: 101 MMOL/L — SIGNIFICANT CHANGE UP (ref 96–108)
CO2 SERPL-SCNC: 22 MMOL/L — SIGNIFICANT CHANGE UP (ref 22–31)
CREAT SERPL-MCNC: 0.85 MG/DL — SIGNIFICANT CHANGE UP (ref 0.5–1.3)
CULTURE RESULTS: SIGNIFICANT CHANGE UP
EOSINOPHIL # BLD AUTO: 0.49 K/UL — SIGNIFICANT CHANGE UP (ref 0–0.5)
EOSINOPHIL NFR BLD AUTO: 5.6 % — SIGNIFICANT CHANGE UP (ref 0–6)
GLUCOSE BLDC GLUCOMTR-MCNC: 224 MG/DL — HIGH (ref 70–99)
GLUCOSE BLDC GLUCOMTR-MCNC: 229 MG/DL — HIGH (ref 70–99)
GLUCOSE BLDC GLUCOMTR-MCNC: 277 MG/DL — HIGH (ref 70–99)
GLUCOSE BLDC GLUCOMTR-MCNC: 308 MG/DL — HIGH (ref 70–99)
GLUCOSE SERPL-MCNC: 252 MG/DL — HIGH (ref 70–99)
HCT VFR BLD CALC: 34.8 % — LOW (ref 39–50)
HGB BLD-MCNC: 11.6 G/DL — LOW (ref 13–17)
IMM GRANULOCYTES NFR BLD AUTO: 0.6 % — SIGNIFICANT CHANGE UP (ref 0–1.5)
LYMPHOCYTES # BLD AUTO: 1.81 K/UL — SIGNIFICANT CHANGE UP (ref 1–3.3)
LYMPHOCYTES # BLD AUTO: 20.7 % — SIGNIFICANT CHANGE UP (ref 13–44)
MCHC RBC-ENTMCNC: 31.1 PG — SIGNIFICANT CHANGE UP (ref 27–34)
MCHC RBC-ENTMCNC: 33.3 GM/DL — SIGNIFICANT CHANGE UP (ref 32–36)
MCV RBC AUTO: 93.3 FL — SIGNIFICANT CHANGE UP (ref 80–100)
MONOCYTES # BLD AUTO: 0.76 K/UL — SIGNIFICANT CHANGE UP (ref 0–0.9)
MONOCYTES NFR BLD AUTO: 8.7 % — SIGNIFICANT CHANGE UP (ref 2–14)
NEUTROPHILS # BLD AUTO: 5.57 K/UL — SIGNIFICANT CHANGE UP (ref 1.8–7.4)
NEUTROPHILS NFR BLD AUTO: 63.8 % — SIGNIFICANT CHANGE UP (ref 43–77)
NRBC # BLD: 0 /100 WBCS — SIGNIFICANT CHANGE UP (ref 0–0)
ORGANISM # SPEC MICROSCOPIC CNT: SIGNIFICANT CHANGE UP
ORGANISM # SPEC MICROSCOPIC CNT: SIGNIFICANT CHANGE UP
PLATELET # BLD AUTO: 248 K/UL — SIGNIFICANT CHANGE UP (ref 150–400)
POTASSIUM SERPL-MCNC: 4.3 MMOL/L — SIGNIFICANT CHANGE UP (ref 3.5–5.3)
POTASSIUM SERPL-SCNC: 4.3 MMOL/L — SIGNIFICANT CHANGE UP (ref 3.5–5.3)
RBC # BLD: 3.73 M/UL — LOW (ref 4.2–5.8)
RBC # FLD: 13.7 % — SIGNIFICANT CHANGE UP (ref 10.3–14.5)
SODIUM SERPL-SCNC: 136 MMOL/L — SIGNIFICANT CHANGE UP (ref 135–145)
SPECIMEN SOURCE: SIGNIFICANT CHANGE UP
VANCOMYCIN TROUGH SERPL-MCNC: 10.9 UG/ML — SIGNIFICANT CHANGE UP (ref 10–20)
WBC # BLD: 8.73 K/UL — SIGNIFICANT CHANGE UP (ref 3.8–10.5)
WBC # FLD AUTO: 8.73 K/UL — SIGNIFICANT CHANGE UP (ref 3.8–10.5)

## 2020-05-30 PROCEDURE — 99233 SBSQ HOSP IP/OBS HIGH 50: CPT | Mod: GC

## 2020-05-30 RX ORDER — VANCOMYCIN HCL 1 G
1750 VIAL (EA) INTRAVENOUS ONCE
Refills: 0 | Status: COMPLETED | OUTPATIENT
Start: 2020-05-30 | End: 2020-05-30

## 2020-05-30 RX ORDER — LINEZOLID 600 MG/300ML
1 INJECTION, SOLUTION INTRAVENOUS
Qty: 22 | Refills: 0
Start: 2020-05-30 | End: 2020-06-09

## 2020-05-30 RX ORDER — INSULIN LISPRO 100/ML
6 VIAL (ML) SUBCUTANEOUS
Refills: 0 | Status: DISCONTINUED | OUTPATIENT
Start: 2020-05-30 | End: 2020-05-31

## 2020-05-30 RX ORDER — INSULIN GLARGINE 100 [IU]/ML
16 INJECTION, SOLUTION SUBCUTANEOUS AT BEDTIME
Refills: 0 | Status: DISCONTINUED | OUTPATIENT
Start: 2020-05-30 | End: 2020-05-31

## 2020-05-30 RX ADMIN — Medication 1 APPLICATION(S): at 06:10

## 2020-05-30 RX ADMIN — Medication 6 UNIT(S): at 17:13

## 2020-05-30 RX ADMIN — Medication 10 MILLIGRAM(S): at 06:10

## 2020-05-30 RX ADMIN — INSULIN GLARGINE 16 UNIT(S): 100 INJECTION, SOLUTION SUBCUTANEOUS at 22:30

## 2020-05-30 RX ADMIN — Medication 1 APPLICATION(S): at 17:14

## 2020-05-30 RX ADMIN — Medication 6: at 22:28

## 2020-05-30 RX ADMIN — Medication 600 MILLIGRAM(S): at 06:09

## 2020-05-30 RX ADMIN — HEPARIN SODIUM 7500 UNIT(S): 5000 INJECTION INTRAVENOUS; SUBCUTANEOUS at 06:10

## 2020-05-30 RX ADMIN — HEPARIN SODIUM 7500 UNIT(S): 5000 INJECTION INTRAVENOUS; SUBCUTANEOUS at 22:27

## 2020-05-30 RX ADMIN — Medication 10 MILLIGRAM(S): at 17:14

## 2020-05-30 RX ADMIN — Medication 600 MILLIGRAM(S): at 17:14

## 2020-05-30 RX ADMIN — HEPARIN SODIUM 7500 UNIT(S): 5000 INJECTION INTRAVENOUS; SUBCUTANEOUS at 14:08

## 2020-05-30 RX ADMIN — Medication 8: at 11:37

## 2020-05-30 RX ADMIN — ATORVASTATIN CALCIUM 40 MILLIGRAM(S): 80 TABLET, FILM COATED ORAL at 22:27

## 2020-05-30 RX ADMIN — Medication 250 MILLIGRAM(S): at 16:32

## 2020-05-30 RX ADMIN — Medication 4: at 08:36

## 2020-05-30 RX ADMIN — Medication 1 APPLICATION(S): at 11:38

## 2020-05-30 RX ADMIN — Medication 3 UNIT(S): at 08:36

## 2020-05-30 RX ADMIN — Medication 4: at 17:12

## 2020-05-30 RX ADMIN — Medication 6 UNIT(S): at 11:38

## 2020-05-30 NOTE — PROGRESS NOTE ADULT - SUBJECTIVE AND OBJECTIVE BOX
5/28: Pt s/p debridement of left posterior neck abscess. Ulcerative area of tissue sent for cultures/path introp. No complications. EBL 5 cc,  cc.   5/29: NAEON, pt remains afebrile, HD stable. Small amount sebaceous debris expressed at bedside this AM.   5/30: NAEON remains afebrile. Dressing changed. Sensitivity to linezolid identified. Vancomycin associated ASHLEY resolved.     MEDICATIONS  (STANDING):  atorvastatin 40 milliGRAM(s) Oral at bedtime  BACItracin   Ointment 1 Application(s) Topical every 6 hours  dextrose 5%. 1000 milliLiter(s) (50 mL/Hr) IV Continuous <Continuous>  dextrose 50% Injectable 12.5 Gram(s) IV Push once  dextrose 50% Injectable 25 Gram(s) IV Push once  dextrose 50% Injectable 25 Gram(s) IV Push once  enalapril 10 milliGRAM(s) Oral every 12 hours  gemfibrozil 600 milliGRAM(s) Oral two times a day  heparin   Injectable 7500 Unit(s) SubCutaneous every 8 hours  insulin glargine Injectable (LANTUS) 16 Unit(s) SubCutaneous at bedtime  insulin lispro (HumaLOG) corrective regimen sliding scale   SubCutaneous Before meals and at bedtime  insulin lispro Injectable (HumaLOG) 6 Unit(s) SubCutaneous three times a day before meals    MEDICATIONS  (PRN):  acetaminophen   Tablet .. 650 milliGRAM(s) Oral every 6 hours PRN Mild Pain (1 - 3), Moderate Pain (4 - 6)  dextrose 40% Gel 15 Gram(s) Oral once PRN Blood Glucose LESS THAN 70 milliGRAM(s)/deciliter  diphenhydrAMINE 50 milliGRAM(s) Oral at bedtime PRN Insomnia  glucagon  Injectable 1 milliGRAM(s) IntraMuscular once PRN Glucose LESS THAN 70 milligrams/deciliter  ondansetron Injectable 4 milliGRAM(s) IV Push every 8 hours PRN Nausea and/or Vomiting  oxyCODONE    IR 5 milliGRAM(s) Oral every 4 hours PRN Severe Pain (7 - 10)    Vital Signs Last 24 Hrs  T(C): 36.7 (30 May 2020 20:23), Max: 36.8 (30 May 2020 16:42)  T(F): 98.1 (30 May 2020 20:23), Max: 98.3 (30 May 2020 16:42)  HR: 81 (30 May 2020 20:23) (73 - 81)  BP: 124/71 (30 May 2020 20:23) (124/71 - 149/84)  BP(mean): --  RR: 17 (30 May 2020 20:23) (17 - 18)  SpO2: 94% (30 May 2020 20:23) (94% - 96%)    PE:   Gen: NAD   Head: NC/AT   Eyes: EOMI, PERRLA  Nose: Clear anteriorly   OC/OP: clear   Neck: Gauze + Bacitracin coated adaptic dressing in place overlying left neck wound. No evidence of bleeding.   Resp: Breathing comfortably on RA                           11.6   8.73  )-----------( 248      ( 30 May 2020 08:10 )             34.8     5/27:   Micro: C. striatum, sens. vanc, linezolid   BCx: NGTD   5/29:   Tissue Cx:   Path:        70M w/PMHx DM, HTN, HLD, L upper back abscess c/b nec fascitis s/p surgical debridement by Dr. Memo Lancaster, failed STSG and prolonged wound care course in 03/2015 now presenting with abscess in same area. Now s/p debridement of left neck wound 5/28.   -Continue IV Abx (Vanc this PM if trough < 20)   -F/u Cultures/Path   -Pain control   -Kosher diet   -Daily dressing changes   -Transition to linezolid tomorrow AM   -OP f/u fo anemia   -Please page ENT w/ any additional questions/concerns

## 2020-05-30 NOTE — PROVIDER CONTACT NOTE (OTHER) - SITUATION
prescription filled medication Linezolid was picked up from [facility] and was given to patient. Pt for discharge tomorrow and [facility] pharmacy close tomorrow. Team MD and primary nurse Karina parikh.

## 2020-05-30 NOTE — PROGRESS NOTE ADULT - ASSESSMENT
69 yo M w/ PMH of DM, HTN, HLD, L upper back abscess c/b necrotizing fascitis s/p surgical debridement by Dr. Memo Lancaster, failed split thickness skin grafts (STSG) and prolonged wound care course in 03/2015 now presenting with abscess in same area x 6 days, not responding to 3 days course of bactrim. Patient admitted for surgical debridement on 5/28. Medicine consulted for pre-op evaluation    #Pre-op evaluation  - Patient is able to perform >4 METS, RCRI class 1 risk, Bolton score of .1%. NSQIP 7.0% (below average) risk for serious complication, 8.0% (below average) risk for any complication  - EKG w/ NSR (HR 95), no ischemic changes  - CXR: no acute infiltrates  - patient is low risk for low risk procedure  - Patient is medically optimized for procedure    #L posterior upper back abscess  - Tissue cx growing corynebacterium resistant to penicillin   - agree with continuing vancomycin   - Pending PO sensitivities, anticipate 2 week total course.     #Sepsis  resolved     #COVID +ve   Patient tested positive on 3/30 and was feeling unwell for 3-4weeks. Currently asymptomatic, repeat swab on 5/27 negative  - will not treat COVID PNA at this time  - c/w current management    #DM  On home Metformin 1000mg BID, Farxiga 10mg and Sitagliptin 100mg qd  -A1c 8.9  -monitor FSG  - Moderate dose sliding scale   - recommend Lantus 10 U QHS, Lispro 3 U TID before meals     #HTN  - c/w Enalapril 10mg BID    #HLD  - c/w Gemfibrozil 600mg BID  - c/w Crestor 10mg qd    #Anemia  Normocytic anemia, unclear baseline Hgb. Patient presenting with Hgb 12.8, no s/s of bleeding, no reported melena, hematochezia, hematemesis  - obtain Fe studies, TSH, B12/Folate  - monitor CBC  - maintain active T&S  - transfuse for Hgb <7    #R hemidiaphragm elevation  Seen on CXR, as well as multiple linear bands of the right lower lung zone to suggest linear atelectasis and/or parenchymal scarring  - currently patient without respiratory complaints  - please obtain collateral from PMD for prior imaging and need for follow up as outpatient  - consider CT chest if patient with respiratory symptoms 71 yo M w/ PMH of DM, HTN, HLD, L upper back abscess c/b necrotizing fascitis s/p surgical debridement by Dr. Memo Lancaster, failed split thickness skin grafts (STSG) and prolonged wound care course in 03/2015 now presenting with abscess in same area x 6 days, not responding to 3 days course of bactrim. Patient admitted for surgical debridement on 5/28. Medicine consulted for pre-op evaluation    #Pre-op evaluation  - Patient is able to perform >4 METS, RCRI class 1 risk, Bolton score of .1%. NSQIP 7.0% (below average) risk for serious complication, 8.0% (below average) risk for any complication  - EKG w/ NSR (HR 95), no ischemic changes  - CXR: no acute infiltrates  - patient is low risk for low risk procedure  - Patient is medically optimized for procedure    #L posterior upper back abscess  - Tissue cx growing corynebacterium resistant to penicillin   - agree with continuing vancomycin   - Discussed with microbiology, verbal confirmation that isolate is sensitive to linezolid.     #Sepsis  resolved     #COVID +ve   Patient tested positive on 3/30 and was feeling unwell for 3-4weeks. Currently asymptomatic, repeat swab on 5/27 negative  - will not treat COVID PNA at this time  - c/w current management    #DM  On home Metformin 1000mg BID, Farxiga 10mg and Sitagliptin 100mg qd  -A1c 8.9  -monitor FSG  - Moderate dose sliding scale   - Would increase Lantus to 16 U QHS, Lispro 6 U TID before meals if patient stays hospitalized     #HTN  - c/w Enalapril 10mg BID    #HLD  - c/w Gemfibrozil 600mg BID  - c/w Crestor 10mg qd    #Anemia  Normocytic anemia, unclear baseline Hgb. Patient presenting with Hgb 12.8, no s/s of bleeding, no reported melena, hematochezia, hematemesis  - obtain Fe studies, TSH, B12/Folate  - monitor CBC  - maintain active T&S  - transfuse for Hgb <7    #R hemidiaphragm elevation  Seen on CXR, as well as multiple linear bands of the right lower lung zone to suggest linear atelectasis and/or parenchymal scarring  - currently patient without respiratory complaints  - please obtain collateral from PMD for prior imaging and need for follow up as outpatient  - consider CT chest if patient with respiratory symptoms 69 yo M w/ PMH of DM, HTN, HLD, L upper back abscess c/b necrotizing fascitis s/p surgical debridement by Dr. Memo Lancaster, failed split thickness skin grafts (STSG) and prolonged wound care course in 03/2015 now presenting with abscess in same area x 6 days, not responding to 3 days course of bactrim. Patient admitted for surgical debridement on 5/28. Medicine consulted for pre-op evaluation    #Pre-op evaluation  - Patient is able to perform >4 METS, RCRI class 1 risk, Bolton score of .1%. NSQIP 7.0% (below average) risk for serious complication, 8.0% (below average) risk for any complication  - EKG w/ NSR (HR 95), no ischemic changes  - CXR: no acute infiltrates  - patient is low risk for low risk procedure  - Patient is medically optimized for procedure    #L posterior upper back abscess  - Tissue cx growing corynebacterium resistant to penicillin   - agree with continuing vancomycin   - Would treat for 14 days total including days treated with vancomycin   - Discussed with microbiology, verbal confirmation that isolate is sensitive to linezolid.     #Sepsis  resolved     #COVID +ve   Patient tested positive on 3/30 and was feeling unwell for 3-4weeks. Currently asymptomatic, repeat swab on 5/27 negative  - will not treat COVID PNA at this time  - c/w current management    #DM  On home Metformin 1000mg BID, Farxiga 10mg and Sitagliptin 100mg qd  -A1c 8.9  -monitor FSG  - Moderate dose sliding scale   - Would increase Lantus to 16 U QHS, Lispro 6 U TID before meals if patient stays hospitalized     #HTN  - c/w Enalapril 10mg BID    #HLD  - c/w Gemfibrozil 600mg BID  - c/w Crestor 10mg qd    #Anemia  Normocytic anemia, unclear baseline Hgb. Patient presenting with Hgb 12.8, no s/s of bleeding, no reported melena, hematochezia, hematemesis  - obtain Fe studies, TSH, B12/Folate  - monitor CBC  - maintain active T&S  - transfuse for Hgb <7    #R hemidiaphragm elevation  Seen on CXR, as well as multiple linear bands of the right lower lung zone to suggest linear atelectasis and/or parenchymal scarring  - currently patient without respiratory complaints  - please obtain collateral from PMD for prior imaging and need for follow up as outpatient  - consider CT chest if patient with respiratory symptoms

## 2020-05-30 NOTE — PROGRESS NOTE ADULT - SUBJECTIVE AND OBJECTIVE BOX
INTERVAL HPI/OVERNIGHT EVENTS: No acute events overnight. Hyperglycemic and received sliding scale coverage. Asymptomatic hypoglycemia. No hypoglycemia appreciated overnight. Tolerating diet, anticipating discharge. Otherwise no acute complaints.     VITAL SIGNS:  T(F): 98 (05-30-20 @ 05:31)  HR: 73 (05-30-20 @ 05:31)  BP: 148/85 (05-30-20 @ 05:31)  RR: 18 (05-30-20 @ 05:31)  SpO2: 94% (05-30-20 @ 05:31)  Wt(kg): --    PHYSICAL EXAM:      Constitutional: NAD, well-groomed, well-developed  HEENT: PERRLA, EOMI, Normal Hearing, MMM  Neck: No LAD, No JVD  Back: Normal spine flexure, No CVA tenderness  Respiratory: CTAB  Cardiovascular: S1 and S2, RRR, no M/G/R  Gastrointestinal: BS+, soft, NT/ND. Obese   Extremities: symmetric b/l extremity edema  Vascular: 2+ peripheral pulses  Neurological: A/O x 3, no focal deficits  Psychiatric: Normal mood, normal affect  Musculoskeletal: 5/5 strength b/l upper and lower extremities  Skin: chronic venous dermatitis changes b/l lower extremities       MEDICATIONS  (STANDING):  atorvastatin 40 milliGRAM(s) Oral at bedtime  BACItracin   Ointment 1 Application(s) Topical every 6 hours  dextrose 5%. 1000 milliLiter(s) (50 mL/Hr) IV Continuous <Continuous>  dextrose 50% Injectable 12.5 Gram(s) IV Push once  dextrose 50% Injectable 25 Gram(s) IV Push once  dextrose 50% Injectable 25 Gram(s) IV Push once  enalapril 10 milliGRAM(s) Oral every 12 hours  gemfibrozil 600 milliGRAM(s) Oral two times a day  heparin   Injectable 7500 Unit(s) SubCutaneous every 8 hours  insulin glargine Injectable (LANTUS) 10 Unit(s) SubCutaneous at bedtime  insulin lispro (HumaLOG) corrective regimen sliding scale   SubCutaneous Before meals and at bedtime  insulin lispro Injectable (HumaLOG) 3 Unit(s) SubCutaneous three times a day before meals    MEDICATIONS  (PRN):  acetaminophen   Tablet .. 650 milliGRAM(s) Oral every 6 hours PRN Mild Pain (1 - 3), Moderate Pain (4 - 6)  dextrose 40% Gel 15 Gram(s) Oral once PRN Blood Glucose LESS THAN 70 milliGRAM(s)/deciliter  diphenhydrAMINE 50 milliGRAM(s) Oral at bedtime PRN Insomnia  glucagon  Injectable 1 milliGRAM(s) IntraMuscular once PRN Glucose LESS THAN 70 milligrams/deciliter  ondansetron Injectable 4 milliGRAM(s) IV Push every 8 hours PRN Nausea and/or Vomiting  oxyCODONE    IR 5 milliGRAM(s) Oral every 4 hours PRN Severe Pain (7 - 10)      Allergies    No Known Allergies    Intolerances        LABS:                        11.6   8.73  )-----------( 248      ( 30 May 2020 08:10 )             34.8     05-30    136  |  101  |  26<H>  ----------------------------<  252<H>  4.3   |  22  |  0.85    Ca    9.8      30 May 2020 08:10            RADIOLOGY & ADDITIONAL TESTS:

## 2020-05-30 NOTE — PROGRESS NOTE ADULT - ATTENDING COMMENTS
70M w/ DM, HTN, HLD, L upper back abscess c/b necrotizing fascitis s/p surgical debridement     # Nec fasc growing corynebacterium and staph hominis (sens pending).  Linezolid PO as outpatient, can continue w/ vanc while inpatient.  C/w monitoring trough (elevated). 70M w/ DM, HTN, HLD, L upper back abscess c/b necrotizing fascitis s/p surgical debridement     # Nec fasc growing corynebacterium and staph hominis (sens pending).  Linezolid PO as outpatient, can continue w/ vanc while inpatient.  C/w monitoring trough (elevated).    #DM: Adjust lantus per above.    # HTN    # HLD

## 2020-05-31 ENCOUNTER — TRANSCRIPTION ENCOUNTER (OUTPATIENT)
Age: 70
End: 2020-05-31

## 2020-05-31 VITALS
SYSTOLIC BLOOD PRESSURE: 139 MMHG | RESPIRATION RATE: 16 BRPM | HEART RATE: 82 BPM | TEMPERATURE: 97 F | DIASTOLIC BLOOD PRESSURE: 75 MMHG | OXYGEN SATURATION: 95 %

## 2020-05-31 LAB — GLUCOSE BLDC GLUCOMTR-MCNC: 258 MG/DL — HIGH (ref 70–99)

## 2020-05-31 PROCEDURE — 83036 HEMOGLOBIN GLYCOSYLATED A1C: CPT

## 2020-05-31 PROCEDURE — 99232 SBSQ HOSP IP/OBS MODERATE 35: CPT

## 2020-05-31 PROCEDURE — 87070 CULTURE OTHR SPECIMN AEROBIC: CPT

## 2020-05-31 PROCEDURE — 93005 ELECTROCARDIOGRAM TRACING: CPT

## 2020-05-31 PROCEDURE — 85610 PROTHROMBIN TIME: CPT

## 2020-05-31 PROCEDURE — 80202 ASSAY OF VANCOMYCIN: CPT

## 2020-05-31 PROCEDURE — 87075 CULTR BACTERIA EXCEPT BLOOD: CPT

## 2020-05-31 PROCEDURE — 86850 RBC ANTIBODY SCREEN: CPT

## 2020-05-31 PROCEDURE — 87635 SARS-COV-2 COVID-19 AMP PRB: CPT

## 2020-05-31 PROCEDURE — C1889: CPT

## 2020-05-31 PROCEDURE — 86901 BLOOD TYPING SEROLOGIC RH(D): CPT

## 2020-05-31 PROCEDURE — 80048 BASIC METABOLIC PNL TOTAL CA: CPT

## 2020-05-31 PROCEDURE — 99285 EMERGENCY DEPT VISIT HI MDM: CPT | Mod: 25

## 2020-05-31 PROCEDURE — 70491 CT SOFT TISSUE NECK W/DYE: CPT

## 2020-05-31 PROCEDURE — 71045 X-RAY EXAM CHEST 1 VIEW: CPT

## 2020-05-31 PROCEDURE — 87040 BLOOD CULTURE FOR BACTERIA: CPT

## 2020-05-31 PROCEDURE — 80053 COMPREHEN METABOLIC PANEL: CPT

## 2020-05-31 PROCEDURE — 82962 GLUCOSE BLOOD TEST: CPT

## 2020-05-31 PROCEDURE — 88304 TISSUE EXAM BY PATHOLOGIST: CPT

## 2020-05-31 PROCEDURE — 86803 HEPATITIS C AB TEST: CPT

## 2020-05-31 PROCEDURE — 96365 THER/PROPH/DIAG IV INF INIT: CPT

## 2020-05-31 PROCEDURE — 85027 COMPLETE CBC AUTOMATED: CPT

## 2020-05-31 PROCEDURE — 36415 COLL VENOUS BLD VENIPUNCTURE: CPT

## 2020-05-31 PROCEDURE — 85730 THROMBOPLASTIN TIME PARTIAL: CPT

## 2020-05-31 PROCEDURE — 85025 COMPLETE CBC W/AUTO DIFF WBC: CPT

## 2020-05-31 RX ORDER — GEMFIBROZIL 600 MG
1 TABLET ORAL
Qty: 0 | Refills: 0 | DISCHARGE
Start: 2020-05-31

## 2020-05-31 RX ORDER — ATORVASTATIN CALCIUM 80 MG/1
1 TABLET, FILM COATED ORAL
Qty: 0 | Refills: 0 | DISCHARGE
Start: 2020-05-31

## 2020-05-31 RX ORDER — BACITRACIN ZINC 500 UNIT/G
1 OINTMENT IN PACKET (EA) TOPICAL
Qty: 0 | Refills: 0 | DISCHARGE
Start: 2020-05-31

## 2020-05-31 RX ADMIN — Medication 600 MILLIGRAM(S): at 05:58

## 2020-05-31 RX ADMIN — Medication 6: at 08:43

## 2020-05-31 RX ADMIN — OXYCODONE HYDROCHLORIDE 5 MILLIGRAM(S): 5 TABLET ORAL at 10:27

## 2020-05-31 RX ADMIN — Medication 6 UNIT(S): at 08:44

## 2020-05-31 RX ADMIN — HEPARIN SODIUM 7500 UNIT(S): 5000 INJECTION INTRAVENOUS; SUBCUTANEOUS at 05:59

## 2020-05-31 RX ADMIN — Medication 10 MILLIGRAM(S): at 05:59

## 2020-05-31 NOTE — DISCHARGE NOTE NURSING/CASE MANAGEMENT/SOCIAL WORK - PATIENT PORTAL LINK FT
You can access the FollowMyHealth Patient Portal offered by [facility] by registering at the following website: [email or web address]. By joining [employer]’s FollowMyHealth portal, you will also be able to view your health information using other applications (apps) compatible with our system.

## 2020-05-31 NOTE — PROGRESS NOTE ADULT - SUBJECTIVE AND OBJECTIVE BOX
O/N Events: ADELA    Subjective/ROS: Denies HA, CP, SOB, n/v, changes in bowel/urinary habits.  12pt ROS otherwise negative.    VITALS  Vital Signs Last 24 Hrs  T(C): 36.3 (31 May 2020 08:30), Max: 36.8 (30 May 2020 16:42)  T(F): 97.3 (31 May 2020 08:30), Max: 98.3 (30 May 2020 16:42)  HR: 82 (31 May 2020 08:30) (76 - 82)  BP: 139/75 (31 May 2020 08:30) (124/71 - 153/81)  BP(mean): --  RR: 16 (31 May 2020 08:30) (16 - 18)  SpO2: 95% (31 May 2020 08:30) (94% - 95%)    CAPILLARY BLOOD GLUCOSE      POCT Blood Glucose.: 258 mg/dL (31 May 2020 07:52)  POCT Blood Glucose.: 277 mg/dL (30 May 2020 22:12)  POCT Blood Glucose.: 229 mg/dL (30 May 2020 17:08)      PHYSICAL EXAM  General: A&Ox3; NAD; obese  Head: NC/AT; MMM; PERRL; EOMI;  Neck: Supple; no JVD  Respiratory: CTA B/L; no wheezes/crackles   Cardiovascular: Regular rhythm/rate; S1/S2   Gastrointestinal: Soft; NTND; normoactive BS  Extremities: WWP; no edema/cyanosis; left shoulder w/ bandage  Neurological:  CNII-XII grossly intact; no obvious focal deficits    MEDICATIONS  (STANDING):  atorvastatin 40 milliGRAM(s) Oral at bedtime  BACItracin   Ointment 1 Application(s) Topical every 6 hours  dextrose 5%. 1000 milliLiter(s) (50 mL/Hr) IV Continuous <Continuous>  dextrose 50% Injectable 12.5 Gram(s) IV Push once  dextrose 50% Injectable 25 Gram(s) IV Push once  dextrose 50% Injectable 25 Gram(s) IV Push once  enalapril 10 milliGRAM(s) Oral every 12 hours  gemfibrozil 600 milliGRAM(s) Oral two times a day  heparin   Injectable 7500 Unit(s) SubCutaneous every 8 hours  insulin glargine Injectable (LANTUS) 16 Unit(s) SubCutaneous at bedtime  insulin lispro (HumaLOG) corrective regimen sliding scale   SubCutaneous Before meals and at bedtime  insulin lispro Injectable (HumaLOG) 6 Unit(s) SubCutaneous three times a day before meals    MEDICATIONS  (PRN):  acetaminophen   Tablet .. 650 milliGRAM(s) Oral every 6 hours PRN Mild Pain (1 - 3), Moderate Pain (4 - 6)  dextrose 40% Gel 15 Gram(s) Oral once PRN Blood Glucose LESS THAN 70 milliGRAM(s)/deciliter  diphenhydrAMINE 50 milliGRAM(s) Oral at bedtime PRN Insomnia  glucagon  Injectable 1 milliGRAM(s) IntraMuscular once PRN Glucose LESS THAN 70 milligrams/deciliter  ondansetron Injectable 4 milliGRAM(s) IV Push every 8 hours PRN Nausea and/or Vomiting  oxyCODONE    IR 5 milliGRAM(s) Oral every 4 hours PRN Severe Pain (7 - 10)      No Known Allergies      LABS                        11.6   8.73  )-----------( 248      ( 30 May 2020 08:10 )             34.8     05-30    136  |  101  |  26<H>  ----------------------------<  252<H>  4.3   |  22  |  0.85    Ca    9.8      30 May 2020 08:10                IMAGING/EKG/ETC: Reviewed

## 2020-05-31 NOTE — PROGRESS NOTE ADULT - ASSESSMENT
70M w/ DM, HTN, HLD, L upper back abscess c/b necrotizing fascitis s/p surgical debridement     # Nec fasc growing corynebacterium and staph hominis. Linezolid PO    #DM: Adjust lantus per above.    # HTN    # HLD    D/c ready for today.

## 2020-06-01 ENCOUNTER — RX RENEWAL (OUTPATIENT)
Age: 70
End: 2020-06-01

## 2020-06-01 LAB
CULTURE RESULTS: SIGNIFICANT CHANGE UP
CULTURE RESULTS: SIGNIFICANT CHANGE UP
SPECIMEN SOURCE: SIGNIFICANT CHANGE UP
SPECIMEN SOURCE: SIGNIFICANT CHANGE UP
SURGICAL PATHOLOGY STUDY: SIGNIFICANT CHANGE UP

## 2020-06-02 DIAGNOSIS — J98.11 ATELECTASIS: ICD-10-CM

## 2020-06-02 DIAGNOSIS — D64.9 ANEMIA, UNSPECIFIED: ICD-10-CM

## 2020-06-02 DIAGNOSIS — Z86.19 PERSONAL HISTORY OF OTHER INFECTIOUS AND PARASITIC DISEASES: ICD-10-CM

## 2020-06-02 DIAGNOSIS — E11.65 TYPE 2 DIABETES MELLITUS WITH HYPERGLYCEMIA: ICD-10-CM

## 2020-06-02 DIAGNOSIS — I10 ESSENTIAL (PRIMARY) HYPERTENSION: ICD-10-CM

## 2020-06-02 DIAGNOSIS — J98.6 DISORDERS OF DIAPHRAGM: ICD-10-CM

## 2020-06-02 DIAGNOSIS — E78.5 HYPERLIPIDEMIA, UNSPECIFIED: ICD-10-CM

## 2020-06-02 DIAGNOSIS — L72.3 SEBACEOUS CYST: ICD-10-CM

## 2020-06-02 DIAGNOSIS — L02.212 CUTANEOUS ABSCESS OF BACK [ANY PART, EXCEPT BUTTOCK]: ICD-10-CM

## 2020-06-02 DIAGNOSIS — A41.9 SEPSIS, UNSPECIFIED ORGANISM: ICD-10-CM

## 2020-06-02 DIAGNOSIS — L02.11 CUTANEOUS ABSCESS OF NECK: ICD-10-CM

## 2020-06-02 DIAGNOSIS — B96.89 OTHER SPECIFIED BACTERIAL AGENTS AS THE CAUSE OF DISEASES CLASSIFIED ELSEWHERE: ICD-10-CM

## 2020-06-02 DIAGNOSIS — M72.6 NECROTIZING FASCIITIS: ICD-10-CM

## 2020-06-02 DIAGNOSIS — N17.9 ACUTE KIDNEY FAILURE, UNSPECIFIED: ICD-10-CM

## 2020-06-02 DIAGNOSIS — B95.7 OTHER STAPHYLOCOCCUS AS THE CAUSE OF DISEASES CLASSIFIED ELSEWHERE: ICD-10-CM

## 2020-06-17 ENCOUNTER — APPOINTMENT (OUTPATIENT)
Dept: HEART AND VASCULAR | Facility: CLINIC | Age: 70
End: 2020-06-17
Payer: MEDICARE

## 2020-06-17 ENCOUNTER — NON-APPOINTMENT (OUTPATIENT)
Age: 70
End: 2020-06-17

## 2020-06-17 VITALS
DIASTOLIC BLOOD PRESSURE: 80 MMHG | RESPIRATION RATE: 12 BRPM | HEART RATE: 76 BPM | HEIGHT: 72 IN | SYSTOLIC BLOOD PRESSURE: 104 MMHG | BODY MASS INDEX: 42.66 KG/M2 | WEIGHT: 315 LBS

## 2020-06-17 PROCEDURE — 99214 OFFICE O/P EST MOD 30 MIN: CPT

## 2020-06-17 PROCEDURE — 93000 ELECTROCARDIOGRAM COMPLETE: CPT

## 2020-06-17 NOTE — HISTORY OF PRESENT ILLNESS
[FreeTextEntry1] : 70-year-old with a past medical history hypertension hyperlipidemia diabetes. Patient status post recent debridement of necrotizing fasciitis of the neck. Overall patient reports feeling well denies chest pain shortness of breath PND orthopnea.

## 2020-06-17 NOTE — DISCUSSION/SUMMARY
[FreeTextEntry1] : 1. Hypertension: Well controlled on enalapril advised to continue\par 2. Hyperlipidemia: Continue gemfibrozil and Crestor\par 3. Diabetes: For now continue oral hypoglycemics will obtain A1c. Advised to see ophthalmology yearly.\par 4. Cardiac murmur: Echocardiogram\par 5. Carotid bruit: Carotid duplex

## 2020-06-17 NOTE — PHYSICAL EXAM
[Well Groomed] : well groomed [General Appearance - Well Developed] : well developed [Normal Appearance] : normal appearance [General Appearance - In No Acute Distress] : no acute distress [No Deformities] : no deformities [FreeTextEntry1] : obese [Normal Oral Mucosa] : normal oral mucosa [No Oral Pallor] : no oral pallor [No Oral Cyanosis] : no oral cyanosis [Normal Jugular Venous A Waves Present] : normal jugular venous A waves present [Normal Jugular Venous V Waves Present] : normal jugular venous V waves present [No Jugular Venous Yuen A Waves] : no jugular venous yuen A waves [Respiration, Rhythm And Depth] : normal respiratory rhythm and effort [Exaggerated Use Of Accessory Muscles For Inspiration] : no accessory muscle use [5th Left ICS - MCL] : palpated at the 5th LICS in the midclavicular line [Auscultation Breath Sounds / Voice Sounds] : lungs were clear to auscultation bilaterally [Normal Rate] : normal [Normal] : normal [Normal S2] : normal S2 [Normal S1] : normal S1 [Rhythm Regular] : regular [III] : a grade 3 [Right Carotid Bruit] : right carotid bruit heard [Left Carotid Bruit] : left carotid bruit heard [No Pitting Edema] : no pitting edema present [Abdomen Tenderness] : non-tender [Abdomen Soft] : soft [Abdomen Mass (___ Cm)] : no abdominal mass palpated [Abnormal Walk] : normal gait [Nail Clubbing] : no clubbing of the fingernails [Gait - Sufficient For Exercise Testing] : the gait was sufficient for exercise testing [Cyanosis, Localized] : no localized cyanosis [Oriented To Time, Place, And Person] : oriented to person, place, and time [Petechial Hemorrhages (___cm)] : no petechial hemorrhages [] : no ischemic changes [Affect] : the affect was normal [No Anxiety] : not feeling anxious [Mood] : the mood was normal

## 2020-06-18 LAB
25(OH)D3 SERPL-MCNC: 37.6 NG/ML
ALBUMIN SERPL ELPH-MCNC: 4.5 G/DL
ALP BLD-CCNC: 96 U/L
ALT SERPL-CCNC: 19 U/L
ANION GAP SERPL CALC-SCNC: 18 MMOL/L
AST SERPL-CCNC: 21 U/L
BASOPHILS # BLD AUTO: 0.08 K/UL
BASOPHILS NFR BLD AUTO: 0.8 %
BILIRUB SERPL-MCNC: 0.2 MG/DL
BUN SERPL-MCNC: 32 MG/DL
CALCIUM SERPL-MCNC: 9.5 MG/DL
CHLORIDE SERPL-SCNC: 98 MMOL/L
CHOLEST SERPL-MCNC: 122 MG/DL
CHOLEST/HDLC SERPL: 3.2 RATIO
CO2 SERPL-SCNC: 18 MMOL/L
CREAT SERPL-MCNC: 1.36 MG/DL
EOSINOPHIL # BLD AUTO: 0.36 K/UL
EOSINOPHIL NFR BLD AUTO: 3.6 %
ESTIMATED AVERAGE GLUCOSE: 223 MG/DL
FOLATE SERPL-MCNC: 16.6 NG/ML
GLUCOSE SERPL-MCNC: 379 MG/DL
HBA1C MFR BLD HPLC: 9.4 %
HCT VFR BLD CALC: 40.3 %
HDLC SERPL-MCNC: 38 MG/DL
HGB BLD-MCNC: 13.3 G/DL
IMM GRANULOCYTES NFR BLD AUTO: 0.6 %
LDLC SERPL CALC-MCNC: 32 MG/DL
LYMPHOCYTES # BLD AUTO: 1.69 K/UL
LYMPHOCYTES NFR BLD AUTO: 17 %
MAGNESIUM SERPL-MCNC: 2 MG/DL
MAN DIFF?: NORMAL
MCHC RBC-ENTMCNC: 31.3 PG
MCHC RBC-ENTMCNC: 33 GM/DL
MCV RBC AUTO: 94.8 FL
MONOCYTES # BLD AUTO: 0.73 K/UL
MONOCYTES NFR BLD AUTO: 7.3 %
NEUTROPHILS # BLD AUTO: 7.02 K/UL
NEUTROPHILS NFR BLD AUTO: 70.7 %
PHOSPHATE SERPL-MCNC: 3.9 MG/DL
PLATELET # BLD AUTO: 237 K/UL
POTASSIUM SERPL-SCNC: 5 MMOL/L
PROT SERPL-MCNC: 7 G/DL
PSA SERPL-MCNC: 1.08 NG/ML
RBC # BLD: 4.25 M/UL
RBC # FLD: 14.3 %
SODIUM SERPL-SCNC: 134 MMOL/L
T3FREE SERPL-MCNC: 2.38 PG/ML
T4 FREE SERPL-MCNC: 1.2 NG/DL
T4 SERPL-MCNC: 6.2 UG/DL
TRIGL SERPL-MCNC: 259 MG/DL
TSH SERPL-ACNC: 1.28 UIU/ML
VIT B12 SERPL-MCNC: 433 PG/ML
WBC # FLD AUTO: 9.94 K/UL

## 2020-06-18 RX ORDER — SILVER SULFADIAZINE 10 MG/G
1 CREAM TOPICAL TWICE DAILY
Qty: 1 | Refills: 2 | Status: ACTIVE | COMMUNITY
Start: 2020-06-18 | End: 1900-01-01

## 2020-07-17 ENCOUNTER — APPOINTMENT (OUTPATIENT)
Dept: HEART AND VASCULAR | Facility: CLINIC | Age: 70
End: 2020-07-17

## 2021-02-09 ENCOUNTER — RX RENEWAL (OUTPATIENT)
Age: 71
End: 2021-02-09

## 2021-02-25 ENCOUNTER — RX RENEWAL (OUTPATIENT)
Age: 71
End: 2021-02-25

## 2021-06-14 ENCOUNTER — RX RENEWAL (OUTPATIENT)
Age: 71
End: 2021-06-14

## 2021-07-13 ENCOUNTER — RX RENEWAL (OUTPATIENT)
Age: 71
End: 2021-07-13

## 2021-12-07 ENCOUNTER — APPOINTMENT (OUTPATIENT)
Dept: HEART AND VASCULAR | Facility: CLINIC | Age: 71
End: 2021-12-07
Payer: MEDICARE

## 2021-12-07 ENCOUNTER — NON-APPOINTMENT (OUTPATIENT)
Age: 71
End: 2021-12-07

## 2021-12-07 VITALS
HEIGHT: 72 IN | DIASTOLIC BLOOD PRESSURE: 80 MMHG | RESPIRATION RATE: 12 BRPM | HEART RATE: 70 BPM | SYSTOLIC BLOOD PRESSURE: 142 MMHG | BODY MASS INDEX: 39.96 KG/M2 | WEIGHT: 295 LBS

## 2021-12-07 DIAGNOSIS — M54.9 DORSALGIA, UNSPECIFIED: ICD-10-CM

## 2021-12-07 PROCEDURE — 93306 TTE W/DOPPLER COMPLETE: CPT

## 2021-12-07 PROCEDURE — 99214 OFFICE O/P EST MOD 30 MIN: CPT

## 2021-12-07 PROCEDURE — 93880 EXTRACRANIAL BILAT STUDY: CPT

## 2021-12-07 PROCEDURE — ZZZZZ: CPT

## 2021-12-07 PROCEDURE — 93000 ELECTROCARDIOGRAM COMPLETE: CPT

## 2021-12-07 NOTE — HISTORY OF PRESENT ILLNESS
[FreeTextEntry1] : 71-year-old male with a past medical history hypertension hyperlipidemia diabetes comes in for routine followup. He is accompanied by his son. Overall, feels well denies chest pain shortness of breath PND or orthopnea. However, does complain of severe lower back pain worse when standing for prolonged period of time relieved with rest or lying down. No paresthesias.

## 2021-12-07 NOTE — PHYSICAL EXAM
[Well Developed] : well developed [No Acute Distress] : no acute distress [Normal Conjunctiva] : normal conjunctiva [Normal Venous Pressure] : normal venous pressure [No Carotid Bruit] : no carotid bruit [Normal S1, S2] : normal S1, S2 [No Rub] : no rub [No Gallop] : no gallop [Murmur] : murmur [Clear Lung Fields] : clear lung fields [Good Air Entry] : good air entry [No Respiratory Distress] : no respiratory distress  [Soft] : abdomen soft [Non Tender] : non-tender [No Masses/organomegaly] : no masses/organomegaly [Normal Bowel Sounds] : normal bowel sounds [Normal Gait] : normal gait [No Edema] : no edema [No Cyanosis] : no cyanosis [No Clubbing] : no clubbing [No Varicosities] : no varicosities [No Rash] : no rash [No Skin Lesions] : no skin lesions [Moves all extremities] : moves all extremities [No Focal Deficits] : no focal deficits [Normal Speech] : normal speech [Alert and Oriented] : alert and oriented [Normal memory] : normal memory [de-identified] : overweight [de-identified] : mars

## 2021-12-07 NOTE — DISCUSSION/SUMMARY
[FreeTextEntry1] : 1. Hypertension: Well controlled on enalapril. Advised low salt diet and weight loss\par 2. Hyperlipidemia: Followup lipid panel continue Crestor\par 3. Diabetes: Continue oral hypoglycemics followup A1c\par 4. Cardiac murmur: Echocardiogram\par 5. Carotid bruit: Carotid duplex\par 6. Back pain: MRI without contrast to rule out spinal stenosis.

## 2021-12-09 LAB
25(OH)D3 SERPL-MCNC: 44.2 NG/ML
ALBUMIN SERPL ELPH-MCNC: 4.2 G/DL
ALP BLD-CCNC: 116 U/L
ALT SERPL-CCNC: 13 U/L
ANION GAP SERPL CALC-SCNC: 17 MMOL/L
AST SERPL-CCNC: 23 U/L
BASOPHILS # BLD AUTO: 0.08 K/UL
BASOPHILS NFR BLD AUTO: 0.5 %
BILIRUB SERPL-MCNC: 0.2 MG/DL
BUN SERPL-MCNC: 18 MG/DL
CALCIUM SERPL-MCNC: 9.9 MG/DL
CHLORIDE SERPL-SCNC: 104 MMOL/L
CHOLEST SERPL-MCNC: 97 MG/DL
CO2 SERPL-SCNC: 22 MMOL/L
COVID-19 NUCLEOCAPSID  GAM ANTIBODY INTERPRETATION: POSITIVE
CREAT SERPL-MCNC: 1.05 MG/DL
EOSINOPHIL # BLD AUTO: 0.31 K/UL
EOSINOPHIL NFR BLD AUTO: 2 %
ESTIMATED AVERAGE GLUCOSE: 183 MG/DL
FOLATE SERPL-MCNC: 14.5 NG/ML
GLUCOSE SERPL-MCNC: 90 MG/DL
HBA1C MFR BLD HPLC: 8 %
HCT VFR BLD CALC: 38.4 %
HDLC SERPL-MCNC: 38 MG/DL
HGB BLD-MCNC: 12.5 G/DL
IMM GRANULOCYTES NFR BLD AUTO: 0.6 %
LDLC SERPL CALC-MCNC: 35 MG/DL
LYMPHOCYTES # BLD AUTO: 2.72 K/UL
LYMPHOCYTES NFR BLD AUTO: 17.9 %
MAGNESIUM SERPL-MCNC: 1.6 MG/DL
MAN DIFF?: NORMAL
MCHC RBC-ENTMCNC: 30.4 PG
MCHC RBC-ENTMCNC: 32.6 GM/DL
MCV RBC AUTO: 93.4 FL
MONOCYTES # BLD AUTO: 1.21 K/UL
MONOCYTES NFR BLD AUTO: 8 %
NEUTROPHILS # BLD AUTO: 10.81 K/UL
NEUTROPHILS NFR BLD AUTO: 71 %
NONHDLC SERPL-MCNC: 60 MG/DL
PHOSPHATE SERPL-MCNC: 3.8 MG/DL
PLATELET # BLD AUTO: 319 K/UL
POTASSIUM SERPL-SCNC: 4.3 MMOL/L
PROT SERPL-MCNC: 7.3 G/DL
PSA SERPL-MCNC: 1.65 NG/ML
RBC # BLD: 4.11 M/UL
RBC # FLD: 13.5 %
SARS-COV-2 AB SERPL QL IA: 123 INDEX
SODIUM SERPL-SCNC: 143 MMOL/L
T3FREE SERPL-MCNC: 2.69 PG/ML
T4 FREE SERPL-MCNC: 1.4 NG/DL
T4 SERPL-MCNC: 7.7 UG/DL
TRIGL SERPL-MCNC: 123 MG/DL
TSH SERPL-ACNC: 1.81 UIU/ML
VIT B12 SERPL-MCNC: 425 PG/ML
WBC # FLD AUTO: 15.22 K/UL

## 2021-12-12 RX ORDER — SITAGLIPTIN 100 MG/1
100 TABLET, FILM COATED ORAL
Qty: 90 | Refills: 1 | Status: DISCONTINUED | COMMUNITY
Start: 2018-09-26 | End: 2021-12-12

## 2021-12-13 RX ORDER — DAPAGLIFLOZIN 10 MG/1
10 TABLET, FILM COATED ORAL
Qty: 90 | Refills: 0 | Status: DISCONTINUED | COMMUNITY
Start: 2019-11-20 | End: 2021-12-13

## 2021-12-13 RX ORDER — GLIMEPIRIDE 4 MG/1
4 TABLET ORAL TWICE DAILY
Qty: 60 | Refills: 3 | Status: DISCONTINUED | COMMUNITY
Start: 2020-06-18 | End: 2021-12-13

## 2021-12-15 ENCOUNTER — APPOINTMENT (OUTPATIENT)
Dept: HEART AND VASCULAR | Facility: CLINIC | Age: 71
End: 2021-12-15
Payer: MEDICARE

## 2021-12-15 VITALS
HEART RATE: 68 BPM | WEIGHT: 296 LBS | RESPIRATION RATE: 12 BRPM | SYSTOLIC BLOOD PRESSURE: 130 MMHG | DIASTOLIC BLOOD PRESSURE: 80 MMHG | BODY MASS INDEX: 40.15 KG/M2

## 2021-12-15 DIAGNOSIS — J06.9 ACUTE UPPER RESPIRATORY INFECTION, UNSPECIFIED: ICD-10-CM

## 2021-12-15 PROCEDURE — 99213 OFFICE O/P EST LOW 20 MIN: CPT

## 2021-12-15 NOTE — HISTORY OF PRESENT ILLNESS
[FreeTextEntry8] : 71-year-old male with an extensive past medical history comes in for evaluation of URI. Patient reports runny nose watery eyes mostly dry cough for the last few weeks. Denies any fever night sweats.

## 2021-12-16 LAB
BASOPHILS # BLD AUTO: 0.06 K/UL
BASOPHILS NFR BLD AUTO: 0.5 %
EOSINOPHIL # BLD AUTO: 0.46 K/UL
EOSINOPHIL NFR BLD AUTO: 3.9 %
HCT VFR BLD CALC: 38.9 %
HGB BLD-MCNC: 12.4 G/DL
IMM GRANULOCYTES NFR BLD AUTO: 0.8 %
LYMPHOCYTES # BLD AUTO: 2.19 K/UL
LYMPHOCYTES NFR BLD AUTO: 18.5 %
MAN DIFF?: NORMAL
MCHC RBC-ENTMCNC: 30.1 PG
MCHC RBC-ENTMCNC: 31.9 GM/DL
MCV RBC AUTO: 94.4 FL
MONOCYTES # BLD AUTO: 0.85 K/UL
MONOCYTES NFR BLD AUTO: 7.2 %
NEUTROPHILS # BLD AUTO: 8.15 K/UL
NEUTROPHILS NFR BLD AUTO: 69.1 %
PLATELET # BLD AUTO: 270 K/UL
RBC # BLD: 4.12 M/UL
RBC # FLD: 13.7 %
WBC # FLD AUTO: 11.81 K/UL

## 2021-12-17 LAB
RAPID RVP RESULT: NOT DETECTED
SARS-COV-2 RNA PNL RESP NAA+PROBE: NOT DETECTED

## 2021-12-30 ENCOUNTER — NON-APPOINTMENT (OUTPATIENT)
Age: 71
End: 2021-12-30

## 2022-03-31 ENCOUNTER — APPOINTMENT (OUTPATIENT)
Dept: HEART AND VASCULAR | Facility: CLINIC | Age: 72
End: 2022-03-31

## 2022-03-31 RX ORDER — GEMFIBROZIL 600 MG/1
600 TABLET, FILM COATED ORAL
Qty: 180 | Refills: 0 | Status: ACTIVE | COMMUNITY
Start: 2019-08-29 | End: 1900-01-01

## 2022-04-14 ENCOUNTER — APPOINTMENT (OUTPATIENT)
Dept: NEPHROLOGY | Facility: CLINIC | Age: 72
End: 2022-04-14
Payer: MEDICARE

## 2022-04-14 ENCOUNTER — LABORATORY RESULT (OUTPATIENT)
Age: 72
End: 2022-04-14

## 2022-04-14 VITALS — HEART RATE: 81 BPM | DIASTOLIC BLOOD PRESSURE: 64 MMHG | SYSTOLIC BLOOD PRESSURE: 120 MMHG

## 2022-04-14 VITALS — TEMPERATURE: 96.9 F

## 2022-04-14 DIAGNOSIS — E66.9 OBESITY, UNSPECIFIED: ICD-10-CM

## 2022-04-14 DIAGNOSIS — E87.2 ACIDOSIS: ICD-10-CM

## 2022-04-14 DIAGNOSIS — N20.0 CALCULUS OF KIDNEY: ICD-10-CM

## 2022-04-14 DIAGNOSIS — R41.3 OTHER AMNESIA: ICD-10-CM

## 2022-04-14 PROCEDURE — 99204 OFFICE O/P NEW MOD 45 MIN: CPT | Mod: 25

## 2022-04-14 PROCEDURE — 36415 COLL VENOUS BLD VENIPUNCTURE: CPT

## 2022-04-14 RX ORDER — INSULIN ASPART 100 [IU]/ML
100 INJECTION, SOLUTION INTRAVENOUS; SUBCUTANEOUS
Qty: 1 | Refills: 3 | Status: ACTIVE | COMMUNITY
Start: 2022-04-14

## 2022-04-14 RX ORDER — METFORMIN HYDROCHLORIDE 1000 MG/1
1000 TABLET, FILM COATED, EXTENDED RELEASE ORAL
Qty: 180 | Refills: 2 | Status: DISCONTINUED | COMMUNITY
Start: 2019-04-30 | End: 2022-04-14

## 2022-04-14 NOTE — ASSESSMENT
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 \par * Recheck labs, including cystatin C, monoclonal protein evaluation, urinalysis, and urine albumin quantification.\par * Will consider restarting SGLT2i.\par * A point of care renal ultrasound using the Butterfly iQ was performed and the images were personally reviewed. (The patient understands that this was a brief study to evaluate for hydronephrosis and not a complete renal ultrasound.) The ultrasound showed no significant hydronephrosis, normal echogenicity. A complete renal ultrasound was recommended and information was provided to the patient about scheduling. They were instructed that this imaging study is important for their health and that it is their responsibility to make and keep this appointment. All their questions were answered.\par * Therapies for kidney disease: blood pressure control; DM control; proteinuria reduction with ARB/ACEi; GLP1 agonist, other evidence-based therapies including exercise, a plant-based lower oxalate diet, and 400 mcg folic acid daily\par * Cardiovascular disease prevention: counseling on healthy diet, physical activity, weight loss, alcohol limitation, blood pressure control\par * A counseling information sheet has been given (today). All their questions were answered.\par * The patient has been counseled that chronic kidney disease is a significant condition and regular office followup with me (at least every 1 month for now) is important for monitoring and their health, and that it is their responsibility to make a follow up appointment.\par * The patient has been counseled never to stop taking their medications without discussing it with me or another doctor.\par * The patient has been counseled on avoiding NSAIDs.\par * The patient has been counseled on risk of acute renal failure and instructed to immediately call and speak with me or go immediately to ER with any severe symptoms, nausea, vomiting, diarrhea, chest pain, or shortness of breath.\par \par # HTN controlled.\par * Cont enalapril.\par * The patient's blood pressure was checked with the [employer] [id number] using the SPRINT trial protocol after sitting quietly in an empty room with arm supported, back supported, and feet on the floor for 5 minutes. The average of 3 readings were taken.\par * A counseling information sheet has been given (today). All their questions were answered.\par * The patient has been counseled to check their BP at home with an automatic arm cuff, write down the readings, and reach me directly on the phone immediately if they are persistently > 180 systolic or if SBP is less than 100 or if lightheadedness develops. They were counseled to bring in all blood pressure readings and medications next visit.\par * The patient has been counseled that regular office followup (at least every 1 month for now)  is important for monitoring and for their health, and that it is their responsibility to make follow up appointments.\par * The patient also has been counseled that they must never stop or change any medications without discussing this with me (or another physician). \par \par # Acidosis.\par * C/W CKD.\par * Doubt lactic acidosis or ketoacidosis. Will defer to endo. \par \par # Kidney stone.\par * Maintain high fluid intake. \par \par

## 2022-04-14 NOTE — CONSULT LETTER
[FreeTextEntry1] : Dear Caroline,\par \par I had the pleasure of seeing Son Liang in consultation for kidney disease. My note is attached.\par \par Thank you for the opportunity to participate in the care of your patient.\par \par Please call me on my mobile phone at [phone] or in the office at [phone] if you have any questions.\par \par Best regards,\par \par Brooks\par \par Brooks Billings MD, FACP, FASN\par [address] #[facility], [place]\par [email or web address] Office: [phone]\par Mobile: [phone]

## 2022-04-14 NOTE — HISTORY OF PRESENT ILLNESS
[FreeTextEntry1] : Kindly referred by Dr. Larry for ASHLEY/CKD. He is here with his son who was also provided counseling.\par \par * Discussed with Dr. Larry. Baseline SCr 1.05. Creatinine increased to 1.5 with decrease in bicarb to 13. Metformin and farxiga were stopped. Bicarb increased to 20.  Labs from ER at [facility] yesterday: pH 7.28, HCO3 18, PCO2 40. ABG Cr 1.4. \par \par The patient denies exposure to chronic NSAIDs, chronic PPIs, green smoothies, creatine, or herbal supplements. The patient denies a history of kidney stones or pyelonephritis. No recent renal ultrasound. They are unaware of proteinuria or hematuria. No recent new medications. \par \par * BP controlled at home, 114/70. 1 episode of lightheadedness last week. No CP/SOB. Compliant with medications. \par \par * He had Covid 2 years ago and is vaccinated. \par  \par 1 kidney stone 30 years ago. \par \par \par \par

## 2022-04-30 LAB
25(OH)D3 SERPL-MCNC: 42.8 NG/ML
ALBUMIN MFR SERPL ELPH: 52.4 %
ALBUMIN SERPL ELPH-MCNC: 4.2 G/DL
ALBUMIN SERPL-MCNC: 3.5 G/DL
ALBUMIN/GLOB SERPL: 1.1 RATIO
ALBUPE: 41.3 %
ALP BLD-CCNC: 135 U/L
ALPHA1 GLOB MFR SERPL ELPH: 5.2 %
ALPHA1 GLOB SERPL ELPH-MCNC: 0.3 G/DL
ALPHA1UPE: 21.5 %
ALPHA2 GLOB MFR SERPL ELPH: 13 %
ALPHA2 GLOB SERPL ELPH-MCNC: 0.9 G/DL
ALPHA2UPE: 13.4 %
ALT SERPL-CCNC: 16 U/L
ANION GAP SERPL CALC-SCNC: 14 MMOL/L
APPEARANCE: CLEAR
AST SERPL-CCNC: 18 U/L
B-GLOBULIN MFR SERPL ELPH: 15.2 %
B-GLOBULIN SERPL ELPH-MCNC: 1 G/DL
BASOPHILS # BLD AUTO: 0.05 K/UL
BASOPHILS NFR BLD AUTO: 0.5 %
BETAUPE: 12.7 %
BILIRUB SERPL-MCNC: 0.2 MG/DL
BILIRUBIN URINE: NEGATIVE
BLOOD URINE: NEGATIVE
BUN SERPL-MCNC: 34 MG/DL
CALCIUM SERPL-MCNC: 9.5 MG/DL
CALCIUM SERPL-MCNC: 9.5 MG/DL
CHLORIDE SERPL-SCNC: 109 MMOL/L
CO2 SERPL-SCNC: 18 MMOL/L
COLOR: NORMAL
CREAT SERPL-MCNC: 1.36 MG/DL
CREAT SPEC-SCNC: 76 MG/DL
CYSTATIN C SERPL-MCNC: 1.27 MG/L
DEPRECATED KAPPA LC FREE/LAMBDA SER: 1.4 RATIO
EGFR: 55 ML/MIN/1.73M2
EOSINOPHIL # BLD AUTO: 0.46 K/UL
EOSINOPHIL NFR BLD AUTO: 4.3 %
GAMMA GLOB FLD ELPH-MCNC: 1 G/DL
GAMMA GLOB MFR SERPL ELPH: 14.2 %
GAMMAUPE: 11.1 %
GFR/BSA.PRED SERPLBLD CYS-BASED-ARV: 54 ML/MIN/1.73M2
GLUCOSE QUALITATIVE U: ABNORMAL
GLUCOSE SERPL-MCNC: 190 MG/DL
HCT VFR BLD CALC: 36.9 %
HGB BLD-MCNC: 12.2 G/DL
IGA 24H UR QL IFE: NORMAL
IGA SER QL IEP: 266 MG/DL
IGG SER QL IEP: 924 MG/DL
IGM SER QL IEP: 54 MG/DL
IMM GRANULOCYTES NFR BLD AUTO: 0.6 %
INTERPRETATION SERPL IEP-IMP: NORMAL
KAPPA LC 24H UR QL: NORMAL
KAPPA LC CSF-MCNC: 2.97 MG/DL
KAPPA LC SERPL-MCNC: 4.17 MG/DL
KETONES URINE: NEGATIVE
LEUKOCYTE ESTERASE URINE: NEGATIVE
LYMPHOCYTES # BLD AUTO: 2.31 K/UL
LYMPHOCYTES NFR BLD AUTO: 21.8 %
M PROTEIN SPEC IFE-MCNC: NORMAL
MAGNESIUM SERPL-MCNC: 1.6 MG/DL
MAN DIFF?: NORMAL
MCHC RBC-ENTMCNC: 30.6 PG
MCHC RBC-ENTMCNC: 33.1 GM/DL
MCV RBC AUTO: 92.5 FL
MICROALBUMIN 24H UR DL<=1MG/L-MCNC: 14.2 MG/DL
MICROALBUMIN/CREAT 24H UR-RTO: 187 MG/G
MONOCYTES # BLD AUTO: 0.83 K/UL
MONOCYTES NFR BLD AUTO: 7.8 %
NEUTROPHILS # BLD AUTO: 6.9 K/UL
NEUTROPHILS NFR BLD AUTO: 65 %
NITRITE URINE: NEGATIVE
PARATHYROID HORMONE INTACT: 32 PG/ML
PH URINE: 5.5
PHOSPHATE SERPL-MCNC: 3.2 MG/DL
PLATELET # BLD AUTO: 273 K/UL
POTASSIUM SERPL-SCNC: 4.4 MMOL/L
PROT PATTERN 24H UR ELPH-IMP: NORMAL
PROT SERPL-MCNC: 6.7 G/DL
PROT UR-MCNC: 40 MG/DL
PROT UR-MCNC: 40 MG/DL
PROTEIN URINE: ABNORMAL
RBC # BLD: 3.99 M/UL
RBC # FLD: 12.8 %
SODIUM SERPL-SCNC: 141 MMOL/L
SPECIFIC GRAVITY URINE: 1.02
UROBILINOGEN URINE: NORMAL
WBC # FLD AUTO: 10.61 K/UL

## 2022-05-04 ENCOUNTER — APPOINTMENT (OUTPATIENT)
Dept: HEART AND VASCULAR | Facility: CLINIC | Age: 72
End: 2022-05-04
Payer: MEDICARE

## 2022-05-04 ENCOUNTER — NON-APPOINTMENT (OUTPATIENT)
Age: 72
End: 2022-05-04

## 2022-05-04 VITALS
WEIGHT: 310 LBS | HEART RATE: 76 BPM | BODY MASS INDEX: 41.99 KG/M2 | DIASTOLIC BLOOD PRESSURE: 80 MMHG | HEIGHT: 72 IN | SYSTOLIC BLOOD PRESSURE: 130 MMHG | RESPIRATION RATE: 12 BRPM

## 2022-05-04 DIAGNOSIS — Z87.898 PERSONAL HISTORY OF OTHER SPECIFIED CONDITIONS: ICD-10-CM

## 2022-05-04 PROCEDURE — ZZZZZ: CPT

## 2022-05-04 PROCEDURE — 93325 DOPPLER ECHO COLOR FLOW MAPG: CPT

## 2022-05-04 PROCEDURE — 93000 ELECTROCARDIOGRAM COMPLETE: CPT

## 2022-05-04 PROCEDURE — 93308 TTE F-UP OR LMTD: CPT

## 2022-05-04 PROCEDURE — 93321 DOPPLER ECHO F-UP/LMTD STD: CPT

## 2022-05-04 PROCEDURE — 99214 OFFICE O/P EST MOD 30 MIN: CPT

## 2022-05-04 RX ORDER — SITAGLIPTIN 100 MG/1
100 TABLET, FILM COATED ORAL DAILY
Qty: 90 | Refills: 0 | Status: DISCONTINUED | COMMUNITY
Start: 2022-04-14 | End: 2022-05-04

## 2022-05-04 NOTE — DISCUSSION/SUMMARY
[FreeTextEntry1] : 1. Near-syncope: Will obtain EKG and repeat followup echocardiogram. Recent carotid duplex revealed nonobstructive carotid atherosclerosis.\par 2. Diabetes: Advised to followup with endocrine. Advised to take insulin in the morning with breakfast\par 3. Hypertension: Adequate control of her medication maintain a low salt diet\par 4. Hyperlipidemia: Continue statins.

## 2022-05-04 NOTE — HISTORY OF PRESENT ILLNESS
[FreeTextEntry1] : 72-year-old male with a past medical history of diabetes hypertension hyperlipidemia comes in for followup accompanied by his daughter. Patient had an incident where he became lightheaded was noted after having OJ and cake sugar was 90. Patient to see endocrine was noted to have electrolyte abnormalities was referred to the emergency room 2 days later where blood work was essentially normal. Patient did describe lightheadedness near syncope however denies any palpitations chest pain shortness of breath PND or orthopnea.

## 2022-05-04 NOTE — PHYSICAL EXAM
[Well Developed] : well developed [No Acute Distress] : no acute distress [Normal Conjunctiva] : normal conjunctiva [Normal Venous Pressure] : normal venous pressure [No Carotid Bruit] : no carotid bruit [Normal S1, S2] : normal S1, S2 [No Rub] : no rub [No Gallop] : no gallop [Murmur] : murmur [Clear Lung Fields] : clear lung fields [Good Air Entry] : good air entry [No Respiratory Distress] : no respiratory distress  [Soft] : abdomen soft [Non Tender] : non-tender [No Masses/organomegaly] : no masses/organomegaly [Normal Bowel Sounds] : normal bowel sounds [Normal Gait] : normal gait [No Edema] : no edema [No Cyanosis] : no cyanosis [No Clubbing] : no clubbing [No Varicosities] : no varicosities [No Rash] : no rash [No Skin Lesions] : no skin lesions [Moves all extremities] : moves all extremities [No Focal Deficits] : no focal deficits [Normal Speech] : normal speech [Alert and Oriented] : alert and oriented [Normal memory] : normal memory [de-identified] : overweight [de-identified] : mars

## 2022-05-16 ENCOUNTER — RESULT REVIEW (OUTPATIENT)
Age: 72
End: 2022-05-16

## 2022-05-16 ENCOUNTER — APPOINTMENT (OUTPATIENT)
Dept: ULTRASOUND IMAGING | Facility: CLINIC | Age: 72
End: 2022-05-16
Payer: MEDICARE

## 2022-05-16 ENCOUNTER — OUTPATIENT (OUTPATIENT)
Dept: OUTPATIENT SERVICES | Facility: HOSPITAL | Age: 72
LOS: 1 days | End: 2022-05-16

## 2022-05-16 PROCEDURE — 76770 US EXAM ABDO BACK WALL COMP: CPT | Mod: 26

## 2022-06-01 ENCOUNTER — RX RENEWAL (OUTPATIENT)
Age: 72
End: 2022-06-01

## 2022-08-10 ENCOUNTER — APPOINTMENT (OUTPATIENT)
Dept: HEART AND VASCULAR | Facility: CLINIC | Age: 72
End: 2022-08-10

## 2023-06-28 ENCOUNTER — APPOINTMENT (OUTPATIENT)
Dept: HEART AND VASCULAR | Facility: CLINIC | Age: 73
End: 2023-06-28

## 2023-08-25 NOTE — DISCHARGE NOTE NURSING/CASE MANAGEMENT/SOCIAL WORK - NSFLUVACAGEDISCH_IMM_ALL_CORE
"Subjective   Ten Carbajal is a 12 m.o. male who is brought in for a health maintenance visit.    Social  Lives with mother, father, and brother.    Diet  Balanced.  Whole milk- about 24oz daily.      Dental  Brushes teeth regularly..    Elimination  No issues.  No blood.    Menses / Dating  N/A.    Sleep  No issues. Shares room with brother.    School /   Home with grandfather every third day during the school year.     Developmental  Social-emotional  Plays games with you (eg, pat-a-cake)  Language/Communication  Waves \"bye-bye\"  Calls a parent \"mama\" or \"licha\" or another special name  Cognitive  Looks for things they see you hide (eg, a toy under a blanket)  Motor  Pulls up to stand  Walks holding onto furniture  Picks thing up between thumb and pointer finger (eg, small bits of food)      Specialist care  None.    Visit screenings  Lead  Anemia    No hearing concerns.  No vision concerns.  Uncorrected.     Objective   Growth parameters are noted and are appropriate for age.    Physical Exam  Constitutional:       General: He is not in acute distress.     Appearance: Normal appearance. He is well-developed.   HENT:      Head: Atraumatic.      Right Ear: Tympanic membrane, ear canal and external ear normal.      Left Ear: Tympanic membrane, ear canal and external ear normal.      Nose: Nose normal.      Mouth/Throat:      Mouth: Mucous membranes are moist.      Pharynx: Oropharynx is clear.   Eyes:      General: Red reflex is present bilaterally.   Cardiovascular:      Rate and Rhythm: Regular rhythm.      Pulses: Normal pulses.      Heart sounds: Normal heart sounds. No murmur heard.  Pulmonary:      Effort: Pulmonary effort is normal.      Breath sounds: Normal breath sounds.   Abdominal:      General: Abdomen is flat.      Palpations: Abdomen is soft. There is no mass.   Genitourinary:     Penis: Normal.       Testes: Normal.   Musculoskeletal:         General: Normal range of motion.      Cervical " back: Normal range of motion and neck supple.   Skin:     General: Skin is warm and dry.      Findings: Rash present.      Comments: Dry, scaly, and erythematous patches to the back and popliteal spaces.   Neurological:      General: No focal deficit present.        Assessment/Plan   Healthy 12 m.o. infant.  1. Anticipatory guidance discussed.  Gave handout on well-child issues at this age.  2. Development: appropriate for age  3. Immunizations today: per orders. VIS's offered, as appropriate.  History of previous adverse reactions to immunizations? no  4. Follow-up visit in 3 months for next well child visit, or sooner as needed.    Diagnoses and all orders for this visit:  Encounter for routine child health examination without abnormal findings  Screening for deficiency anemia  -     POCT hemoglobin manually resulted  Screening for lead poisoning  -     Lead, Filter Paper; Future  BMI 5% to less than 85% for age  Other orders  -     MMR vaccine, subcutaneous (MMR II)  -     Varicella vaccine, subcutaneous (VARIVAX)  -     Hepatitis A vaccine, pediatric/adolescent (HAVRIX, VAQTA)       Adult

## 2023-12-18 ENCOUNTER — NON-APPOINTMENT (OUTPATIENT)
Age: 73
End: 2023-12-18

## 2023-12-18 ENCOUNTER — APPOINTMENT (OUTPATIENT)
Dept: HEART AND VASCULAR | Facility: CLINIC | Age: 73
End: 2023-12-18
Payer: MEDICARE

## 2023-12-18 VITALS
DIASTOLIC BLOOD PRESSURE: 90 MMHG | SYSTOLIC BLOOD PRESSURE: 170 MMHG | HEIGHT: 72 IN | HEART RATE: 102 BPM | WEIGHT: 315 LBS | OXYGEN SATURATION: 95 % | BODY MASS INDEX: 42.66 KG/M2 | RESPIRATION RATE: 12 BRPM

## 2023-12-18 DIAGNOSIS — R01.1 CARDIAC MURMUR, UNSPECIFIED: ICD-10-CM

## 2023-12-18 DIAGNOSIS — R09.89 OTHER SPECIFIED SYMPTOMS AND SIGNS INVOLVING THE CIRCULATORY AND RESPIRATORY SYSTEMS: ICD-10-CM

## 2023-12-18 PROCEDURE — 99214 OFFICE O/P EST MOD 30 MIN: CPT | Mod: 25

## 2023-12-18 PROCEDURE — ZZZZZ: CPT

## 2023-12-18 PROCEDURE — 93306 TTE W/DOPPLER COMPLETE: CPT

## 2023-12-18 PROCEDURE — 93000 ELECTROCARDIOGRAM COMPLETE: CPT

## 2023-12-18 PROCEDURE — 93880 EXTRACRANIAL BILAT STUDY: CPT

## 2023-12-18 RX ORDER — APIXABAN 5 MG/1
5 TABLET, FILM COATED ORAL
Qty: 60 | Refills: 3 | Status: ACTIVE | COMMUNITY
Start: 2023-12-18 | End: 1900-01-01

## 2023-12-18 RX ORDER — ENALAPRIL MALEATE 10 MG/1
10 TABLET ORAL
Qty: 180 | Refills: 3 | Status: DISCONTINUED | COMMUNITY
Start: 2022-06-01 | End: 2023-12-18

## 2023-12-18 RX ORDER — DULAGLUTIDE 3 MG/.5ML
3 INJECTION, SOLUTION SUBCUTANEOUS
Qty: 3 | Refills: 3 | Status: DISCONTINUED | COMMUNITY
Start: 2021-12-13 | End: 2023-12-18

## 2023-12-18 NOTE — HISTORY OF PRESENT ILLNESS
[FreeTextEntry1] : 73-year-old male with a past medical history of chronic CKD, hypertension hyperlipidemia and diabetes under the care of endocrinology comes in accompanied by his daughter after being lost to follow-up for over a year and a half.  Overall, he is now relegated to a wheelchair for any walk more than 1-2 blocks due to progressive shortness of breath.  Denies any PND orthopnea or chest discomfort.

## 2023-12-18 NOTE — PHYSICAL EXAM
[Well Developed] : well developed [No Acute Distress] : no acute distress [Normal Conjunctiva] : normal conjunctiva [Normal Venous Pressure] : normal venous pressure [Carotid Bruit] : carotid bruit [Normal S1, S2] : normal S1, S2 [No Rub] : no rub [No Gallop] : no gallop [Murmur] : murmur [Clear Lung Fields] : clear lung fields [Good Air Entry] : good air entry [No Respiratory Distress] : no respiratory distress  [Soft] : abdomen soft [Non Tender] : non-tender [No Masses/organomegaly] : no masses/organomegaly [Normal Bowel Sounds] : normal bowel sounds [Normal Gait] : normal gait [No Edema] : no edema [No Cyanosis] : no cyanosis [No Clubbing] : no clubbing [No Varicosities] : no varicosities [No Rash] : no rash [No Skin Lesions] : no skin lesions [Moves all extremities] : moves all extremities [No Focal Deficits] : no focal deficits [Normal Speech] : normal speech [Alert and Oriented] : alert and oriented [Normal memory] : normal memory [de-identified] : overweight [de-identified] : mars

## 2023-12-18 NOTE — DISCUSSION/SUMMARY
[EKG obtained to assist in diagnosis and management of assessed problem(s)] : EKG obtained to assist in diagnosis and management of assessed problem(s) [FreeTextEntry1] : 1.  Hypertension: Blood pressure currently not at goal, will advise on regimen of blood pressure medication once results of blood work is known.  Will likely need to start Imdur/hydralazine if kidney function abnormal. 2.  Edema: Echocardiogram will consider short course of diuretic, patient currently seeing vascular 3.  Hyperlipidemia: Follow-up lipid panel 4.  Cardiac murmur: Echocardiogram 5.  Diabetes: Follow-up A1c, patient follows with endocrine advised to continue. 6.  Carotid bruit: Carotid duplex 7. ASHLEY: renal f/u 8.  Likely has ZAN will need to be formally diagnosed and treated. EKG reviewed patient in atrial fibrillation.  Will start carvedilol and Eliquis, will consider diuretic once blood test results are known.  Will also consider CT of the chest abdomen pelvis with p.o. and IV contrast once blood test results are known.

## 2023-12-19 LAB
25(OH)D3 SERPL-MCNC: 13.7 NG/ML
ALBUMIN SERPL ELPH-MCNC: 2.9 G/DL
ALP BLD-CCNC: 146 U/L
ALT SERPL-CCNC: 21 U/L
ANION GAP SERPL CALC-SCNC: 18 MMOL/L
AST SERPL-CCNC: 33 U/L
BILIRUB SERPL-MCNC: 0.3 MG/DL
BUN SERPL-MCNC: 24 MG/DL
CALCIUM SERPL-MCNC: 8.6 MG/DL
CHLORIDE SERPL-SCNC: 102 MMOL/L
CHOLEST SERPL-MCNC: 79 MG/DL
CO2 SERPL-SCNC: 22 MMOL/L
CREAT SERPL-MCNC: 1.47 MG/DL
EGFR: 50 ML/MIN/1.73M2
ESTIMATED AVERAGE GLUCOSE: 146 MG/DL
FERRITIN SERPL-MCNC: 201 NG/ML
FOLATE SERPL-MCNC: 12 NG/ML
GLUCOSE SERPL-MCNC: 108 MG/DL
HBA1C MFR BLD HPLC: 6.7 %
HCT VFR BLD CALC: 31.7 %
HDLC SERPL-MCNC: 33 MG/DL
HGB BLD-MCNC: 9.6 G/DL
IRON SATN MFR SERPL: 16 %
IRON SERPL-MCNC: 41 UG/DL
LDLC SERPL CALC-MCNC: 29 MG/DL
MAGNESIUM SERPL-MCNC: 2 MG/DL
MCHC RBC-ENTMCNC: 27 PG
MCHC RBC-ENTMCNC: 30.3 GM/DL
MCV RBC AUTO: 89 FL
NONHDLC SERPL-MCNC: 46 MG/DL
NT-PROBNP SERPL-MCNC: 2643 PG/ML
PHOSPHATE SERPL-MCNC: 3.8 MG/DL
PLATELET # BLD AUTO: 362 K/UL
POTASSIUM SERPL-SCNC: 4.6 MMOL/L
PROT SERPL-MCNC: 6.9 G/DL
PSA SERPL-MCNC: 1.47 NG/ML
RBC # BLD: 3.56 M/UL
RBC # FLD: 16.1 %
SODIUM SERPL-SCNC: 142 MMOL/L
T3FREE SERPL-MCNC: 1.66 PG/ML
T4 FREE SERPL-MCNC: 1.1 NG/DL
T4 SERPL-MCNC: 5.4 UG/DL
TIBC SERPL-MCNC: 253 UG/DL
TRIGL SERPL-MCNC: 83 MG/DL
TSH SERPL-ACNC: 2.7 UIU/ML
UIBC SERPL-MCNC: 213 UG/DL
VIT B12 SERPL-MCNC: 327 PG/ML
WBC # FLD AUTO: 10.26 K/UL

## 2023-12-19 RX ORDER — TORSEMIDE 10 MG/1
10 TABLET ORAL DAILY
Qty: 30 | Refills: 5 | Status: ACTIVE | COMMUNITY
Start: 2023-12-19 | End: 1900-01-01

## 2023-12-20 LAB — APO LP(A) SERPL-MCNC: <9 NMOL/L

## 2024-01-03 ENCOUNTER — APPOINTMENT (OUTPATIENT)
Dept: HEART AND VASCULAR | Facility: CLINIC | Age: 74
End: 2024-01-03
Payer: MEDICARE

## 2024-01-03 VITALS
WEIGHT: 313 LBS | SYSTOLIC BLOOD PRESSURE: 130 MMHG | BODY MASS INDEX: 42.39 KG/M2 | HEART RATE: 96 BPM | DIASTOLIC BLOOD PRESSURE: 80 MMHG | RESPIRATION RATE: 12 BRPM | HEIGHT: 72 IN

## 2024-01-03 PROCEDURE — 99214 OFFICE O/P EST MOD 30 MIN: CPT

## 2024-01-03 RX ORDER — COLCHICINE 0.6 MG/1
0.6 TABLET ORAL DAILY
Qty: 30 | Refills: 1 | Status: ACTIVE | COMMUNITY
Start: 2024-01-03 | End: 1900-01-01

## 2024-01-03 NOTE — PHYSICAL EXAM
[Well Developed] : well developed [No Acute Distress] : no acute distress [Normal Conjunctiva] : normal conjunctiva [Normal Venous Pressure] : normal venous pressure [Carotid Bruit] : carotid bruit [Normal S1, S2] : normal S1, S2 [No Rub] : no rub [No Gallop] : no gallop [Murmur] : murmur [Clear Lung Fields] : clear lung fields [Good Air Entry] : good air entry [No Respiratory Distress] : no respiratory distress  [Soft] : abdomen soft [Non Tender] : non-tender [No Masses/organomegaly] : no masses/organomegaly [Normal Bowel Sounds] : normal bowel sounds [Normal Gait] : normal gait [No Edema] : no edema [No Cyanosis] : no cyanosis [No Clubbing] : no clubbing [No Varicosities] : no varicosities [No Rash] : no rash [No Skin Lesions] : no skin lesions [Moves all extremities] : moves all extremities [No Focal Deficits] : no focal deficits [Normal Speech] : normal speech [Alert and Oriented] : alert and oriented [Normal memory] : normal memory [de-identified] : overweight [de-identified] : mars

## 2024-01-03 NOTE — DISCUSSION/SUMMARY
[FreeTextEntry1] : 1 atrial fibrillation: Will increase carvedilol to 12.5 mg bid 2.  Hypertension: Continue current medication 3.  Hyperlipidemia: Continue statin 4.  Knee pain: Likely pseudogout, will obtain a uric acid level and start colchicine

## 2024-01-03 NOTE — HISTORY OF PRESENT ILLNESS
[FreeTextEntry1] : 73-year-old male with past medical history of atrial fibrillation acute on chronic renal insufficiency anemia comes in accompanied by his daughter for follow-up.  Since last visit he does feel that he is a little bit improved however now complains of right knee pain.  Denies chest pain shortness of breath PND orthopnea or palpitations.

## 2024-01-04 LAB
ANION GAP SERPL CALC-SCNC: 12 MMOL/L
BUN SERPL-MCNC: 25 MG/DL
CALCIUM SERPL-MCNC: 8.3 MG/DL
CHLORIDE SERPL-SCNC: 104 MMOL/L
CO2 SERPL-SCNC: 23 MMOL/L
CREAT SERPL-MCNC: 1.25 MG/DL
EGFR: 60 ML/MIN/1.73M2
GLUCOSE SERPL-MCNC: 82 MG/DL
POTASSIUM SERPL-SCNC: 3.7 MMOL/L
SODIUM SERPL-SCNC: 140 MMOL/L
URATE SERPL-MCNC: 5.7 MG/DL

## 2024-01-29 ENCOUNTER — APPOINTMENT (OUTPATIENT)
Dept: HEART AND VASCULAR | Facility: CLINIC | Age: 74
End: 2024-01-29
Payer: MEDICARE

## 2024-01-29 VITALS
RESPIRATION RATE: 12 BRPM | HEIGHT: 72 IN | BODY MASS INDEX: 42.66 KG/M2 | HEART RATE: 93 BPM | WEIGHT: 315 LBS | DIASTOLIC BLOOD PRESSURE: 76 MMHG | SYSTOLIC BLOOD PRESSURE: 130 MMHG | OXYGEN SATURATION: 96 %

## 2024-01-29 DIAGNOSIS — R60.0 LOCALIZED EDEMA: ICD-10-CM

## 2024-01-29 PROCEDURE — 99214 OFFICE O/P EST MOD 30 MIN: CPT

## 2024-01-29 PROCEDURE — G2211 COMPLEX E/M VISIT ADD ON: CPT

## 2024-01-29 RX ORDER — CARVEDILOL 25 MG/1
25 TABLET, FILM COATED ORAL
Qty: 60 | Refills: 5 | Status: ACTIVE | COMMUNITY
Start: 2023-12-18 | End: 1900-01-01

## 2024-01-29 NOTE — HISTORY OF PRESENT ILLNESS
[FreeTextEntry1] : 73-year-old male with past medical history of atrial fibrillation on anticoagulation chronic anemia status post colonoscopy with cauterization of 2 "bleeding vessels" with a large polyps to left behind with a plan to go in for resection.  Overall, still profoundly short of breath on exertional activity and feeling fatigued.  Denies chest pain PND orthopnea.

## 2024-01-29 NOTE — DISCUSSION/SUMMARY
[FreeTextEntry1] : 1.  Atrial fibrillation: Resting heart rate 70 but after few steps jumps to 93 will increase carvedilol to 25 mg twice daily. 2.  Lower extremity edema: Likely in part due to diastolic CHF we will add metolazone 5 mg 3 times a week to be taken half hour before the torsemide. 3.  Hypertension: Continue current medication for now.

## 2024-01-29 NOTE — PHYSICAL EXAM
[Well Developed] : well developed [No Acute Distress] : no acute distress [Normal Conjunctiva] : normal conjunctiva [Normal Venous Pressure] : normal venous pressure [Carotid Bruit] : carotid bruit [Normal S1, S2] : normal S1, S2 [No Rub] : no rub [No Gallop] : no gallop [Murmur] : murmur [Clear Lung Fields] : clear lung fields [Good Air Entry] : good air entry [No Respiratory Distress] : no respiratory distress  [Soft] : abdomen soft [Non Tender] : non-tender [No Masses/organomegaly] : no masses/organomegaly [Normal Bowel Sounds] : normal bowel sounds [Normal Gait] : normal gait [No Cyanosis] : no cyanosis [No Clubbing] : no clubbing [No Varicosities] : no varicosities [No Rash] : no rash [No Skin Lesions] : no skin lesions [Moves all extremities] : moves all extremities [No Focal Deficits] : no focal deficits [Normal Speech] : normal speech [Alert and Oriented] : alert and oriented [Normal memory] : normal memory [de-identified] : overweight [de-identified] : mars [de-identified] : 2+ b/l le edema extends to poterior thighs b/l

## 2024-01-30 LAB
ALBUMIN SERPL ELPH-MCNC: 3.2 G/DL
ALP BLD-CCNC: 157 U/L
ALT SERPL-CCNC: 7 U/L
ANION GAP SERPL CALC-SCNC: 12 MMOL/L
AST SERPL-CCNC: 13 U/L
BILIRUB SERPL-MCNC: 0.2 MG/DL
BUN SERPL-MCNC: 24 MG/DL
CALCIUM SERPL-MCNC: 8.1 MG/DL
CHLORIDE SERPL-SCNC: 106 MMOL/L
CO2 SERPL-SCNC: 23 MMOL/L
CREAT SERPL-MCNC: 1.46 MG/DL
EGFR: 50 ML/MIN/1.73M2
FERRITIN SERPL-MCNC: 90 NG/ML
GLUCOSE SERPL-MCNC: 136 MG/DL
HCT VFR BLD CALC: 33.6 %
HGB BLD-MCNC: 10 G/DL
IRON SATN MFR SERPL: 11 %
IRON SERPL-MCNC: 36 UG/DL
MAGNESIUM SERPL-MCNC: 1.7 MG/DL
MCHC RBC-ENTMCNC: 27 PG
MCHC RBC-ENTMCNC: 29.8 GM/DL
MCV RBC AUTO: 90.6 FL
PHOSPHATE SERPL-MCNC: 3.4 MG/DL
PLATELET # BLD AUTO: 286 K/UL
POTASSIUM SERPL-SCNC: 3.7 MMOL/L
PROT SERPL-MCNC: 6.9 G/DL
RBC # BLD: 3.71 M/UL
RBC # BLD: 3.71 M/UL
RBC # FLD: 19.1 %
RETICS # AUTO: 2.9 %
RETICS AGGREG/RBC NFR: 103.5 K/UL
SODIUM SERPL-SCNC: 141 MMOL/L
TIBC SERPL-MCNC: 341 UG/DL
TSH SERPL-ACNC: 3.17 UIU/ML
UIBC SERPL-MCNC: 305 UG/DL
WBC # FLD AUTO: 9.88 K/UL

## 2024-01-30 RX ORDER — POTASSIUM CHLORIDE 1500 MG/1
20 TABLET, EXTENDED RELEASE ORAL
Qty: 30 | Refills: 3 | Status: ACTIVE | COMMUNITY
Start: 2024-01-30 | End: 1900-01-01

## 2024-02-26 ENCOUNTER — APPOINTMENT (OUTPATIENT)
Dept: HEART AND VASCULAR | Facility: CLINIC | Age: 74
End: 2024-02-26
Payer: MEDICARE

## 2024-02-26 ENCOUNTER — NON-APPOINTMENT (OUTPATIENT)
Age: 74
End: 2024-02-26

## 2024-02-26 VITALS
HEART RATE: 80 BPM | DIASTOLIC BLOOD PRESSURE: 80 MMHG | HEIGHT: 72 IN | WEIGHT: 315 LBS | SYSTOLIC BLOOD PRESSURE: 140 MMHG | RESPIRATION RATE: 12 BRPM | BODY MASS INDEX: 42.66 KG/M2

## 2024-02-26 PROCEDURE — 99214 OFFICE O/P EST MOD 30 MIN: CPT | Mod: 25

## 2024-02-26 PROCEDURE — 93000 ELECTROCARDIOGRAM COMPLETE: CPT

## 2024-02-26 NOTE — PHYSICAL EXAM
[Well Developed] : well developed [No Acute Distress] : no acute distress [Normal Conjunctiva] : normal conjunctiva [Normal Venous Pressure] : normal venous pressure [Carotid Bruit] : carotid bruit [Normal S1, S2] : normal S1, S2 [No Gallop] : no gallop [No Rub] : no rub [Murmur] : murmur [Clear Lung Fields] : clear lung fields [Good Air Entry] : good air entry [No Respiratory Distress] : no respiratory distress  [Soft] : abdomen soft [No Masses/organomegaly] : no masses/organomegaly [Non Tender] : non-tender [Normal Bowel Sounds] : normal bowel sounds [Normal Gait] : normal gait [No Varicosities] : no varicosities [No Cyanosis] : no cyanosis [No Clubbing] : no clubbing [No Rash] : no rash [No Skin Lesions] : no skin lesions [Moves all extremities] : moves all extremities [No Focal Deficits] : no focal deficits [Normal Speech] : normal speech [Normal memory] : normal memory [Alert and Oriented] : alert and oriented [de-identified] : mars [de-identified] : overweight [de-identified] : 2+ b/l le edema extends to poterior thighs b/l

## 2024-02-26 NOTE — DISCUSSION/SUMMARY
[FreeTextEntry1] : 1.  Atrial fibrillation: EKG continue carvedilol and Eliquis 2.  Chronic renal insufficiency: Renal follow-up follow-up CMP 3.  Hypertension: Well-controlled on current medication advised to continue 4.  Hyperlipidemia: Continue statin [EKG obtained to assist in diagnosis and management of assessed problem(s)] : EKG obtained to assist in diagnosis and management of assessed problem(s)

## 2024-02-26 NOTE — HISTORY OF PRESENT ILLNESS
[FreeTextEntry1] : 73-year-old male with past medical history of diastolic CHF chronic renal insufficiency atrial fibrillation comes in for follow-up.  Denies any chest pain shortness of breath PND orthopnea.  However, is having issues with balance and fatigue.

## 2024-02-27 LAB
ALBUMIN SERPL ELPH-MCNC: 3.8 G/DL
ALP BLD-CCNC: 134 U/L
ALT SERPL-CCNC: 7 U/L
ANION GAP SERPL CALC-SCNC: 16 MMOL/L
AST SERPL-CCNC: 12 U/L
BILIRUB SERPL-MCNC: 0.3 MG/DL
BUN SERPL-MCNC: 45 MG/DL
CALCIUM SERPL-MCNC: 8.7 MG/DL
CHLORIDE SERPL-SCNC: 108 MMOL/L
CO2 SERPL-SCNC: 16 MMOL/L
CREAT SERPL-MCNC: 1.61 MG/DL
EGFR: 45 ML/MIN/1.73M2
GLUCOSE SERPL-MCNC: 148 MG/DL
HCT VFR BLD CALC: 35.1 %
HGB BLD-MCNC: 10.5 G/DL
MCHC RBC-ENTMCNC: 26.5 PG
MCHC RBC-ENTMCNC: 29.9 GM/DL
MCV RBC AUTO: 88.6 FL
PLATELET # BLD AUTO: 246 K/UL
POTASSIUM SERPL-SCNC: 4.1 MMOL/L
PROT SERPL-MCNC: 7.6 G/DL
RBC # BLD: 3.96 M/UL
RBC # FLD: 20.6 %
SODIUM SERPL-SCNC: 140 MMOL/L
WBC # FLD AUTO: 8.65 K/UL

## 2024-03-26 ENCOUNTER — TRANSCRIPTION ENCOUNTER (OUTPATIENT)
Age: 74
End: 2024-03-26

## 2024-04-11 ENCOUNTER — TRANSCRIPTION ENCOUNTER (OUTPATIENT)
Age: 74
End: 2024-04-11

## 2024-04-30 ENCOUNTER — NON-APPOINTMENT (OUTPATIENT)
Age: 74
End: 2024-04-30

## 2024-05-20 ENCOUNTER — APPOINTMENT (OUTPATIENT)
Dept: HEART AND VASCULAR | Facility: CLINIC | Age: 74
End: 2024-05-20
Payer: MEDICARE

## 2024-05-20 ENCOUNTER — NON-APPOINTMENT (OUTPATIENT)
Age: 74
End: 2024-05-20

## 2024-05-20 VITALS
HEART RATE: 70 BPM | BODY MASS INDEX: 42.66 KG/M2 | SYSTOLIC BLOOD PRESSURE: 122 MMHG | HEIGHT: 72 IN | WEIGHT: 315 LBS | DIASTOLIC BLOOD PRESSURE: 80 MMHG | RESPIRATION RATE: 12 BRPM

## 2024-05-20 DIAGNOSIS — I48.91 UNSPECIFIED ATRIAL FIBRILLATION: ICD-10-CM

## 2024-05-20 DIAGNOSIS — Z86.39 PERSONAL HISTORY OF OTHER ENDOCRINE, NUTRITIONAL AND METABOLIC DISEASE: ICD-10-CM

## 2024-05-20 DIAGNOSIS — I10 ESSENTIAL (PRIMARY) HYPERTENSION: ICD-10-CM

## 2024-05-20 DIAGNOSIS — E11.9 TYPE 2 DIABETES MELLITUS W/OUT COMPLICATIONS: ICD-10-CM

## 2024-05-20 DIAGNOSIS — N17.9 ACUTE KIDNEY FAILURE, UNSPECIFIED: ICD-10-CM

## 2024-05-20 PROCEDURE — 93000 ELECTROCARDIOGRAM COMPLETE: CPT

## 2024-05-20 PROCEDURE — G2211 COMPLEX E/M VISIT ADD ON: CPT

## 2024-05-20 PROCEDURE — 99214 OFFICE O/P EST MOD 30 MIN: CPT

## 2024-05-20 RX ORDER — LEVOFLOXACIN 500 MG/1
500 TABLET, FILM COATED ORAL
Qty: 10 | Refills: 0 | Status: COMPLETED | COMMUNITY
Start: 2024-05-01 | End: 2024-05-20

## 2024-05-20 RX ORDER — INSULIN DEGLUDEC INJECTION 200 U/ML
200 INJECTION, SOLUTION SUBCUTANEOUS
Qty: 1 | Refills: 0 | Status: ACTIVE | COMMUNITY
Start: 2021-12-13

## 2024-05-20 RX ORDER — METOLAZONE 5 MG/1
5 TABLET ORAL
Qty: 30 | Refills: 3 | Status: COMPLETED | COMMUNITY
Start: 2024-01-29 | End: 2024-05-20

## 2024-05-20 RX ORDER — INSULIN ASPART 100 [IU]/ML
100 INJECTION, SOLUTION INTRAVENOUS; SUBCUTANEOUS
Qty: 3 | Refills: 1 | Status: ACTIVE | COMMUNITY
Start: 2021-12-13

## 2024-05-20 RX ORDER — DAPAGLIFLOZIN 10 MG/1
10 TABLET, FILM COATED ORAL
Refills: 0 | Status: COMPLETED | COMMUNITY
End: 2024-05-20

## 2024-05-20 NOTE — PHYSICAL EXAM
[Well Developed] : well developed [No Acute Distress] : no acute distress [Normal Conjunctiva] : normal conjunctiva [Normal Venous Pressure] : normal venous pressure [Carotid Bruit] : carotid bruit [Normal S1, S2] : normal S1, S2 [No Rub] : no rub [No Gallop] : no gallop [Murmur] : murmur [Clear Lung Fields] : clear lung fields [Good Air Entry] : good air entry [No Respiratory Distress] : no respiratory distress  [Soft] : abdomen soft [Non Tender] : non-tender [No Masses/organomegaly] : no masses/organomegaly [Normal Bowel Sounds] : normal bowel sounds [Normal Gait] : normal gait [No Cyanosis] : no cyanosis [No Clubbing] : no clubbing [No Varicosities] : no varicosities [No Rash] : no rash [No Skin Lesions] : no skin lesions [Moves all extremities] : moves all extremities [No Focal Deficits] : no focal deficits [Normal Speech] : normal speech [Alert and Oriented] : alert and oriented [Normal memory] : normal memory [de-identified] : overweight [de-identified] : mars [de-identified] : 1+ b/l le edema extends to poterior thighs b/l

## 2024-05-20 NOTE — HISTORY OF PRESENT ILLNESS
[FreeTextEntry1] : 74-year-old male with extensive past medical history including anemia diabetes comes in for follow-up with his home health aide and daughter.  Since last visit patient had multiple trips to the emergency room finally completed course of antibiotics for UTI was seen by nephrology and had medication adjustments specifically discontinuation of Farxiga lowering of enalapril.  Has been on a sliding scale insulin as patient has been losing weight total of 18 pounds since last visit 3 months ago.  Denies chest pain shortness of breath PND orthopnea.

## 2024-05-20 NOTE — DISCUSSION/SUMMARY
[FreeTextEntry1] : 1.  Atrial fibrillation: Continue carvedilol and Eliquis. 2.  Hypertension: Adequately controlled on the adjusted dose of medication continue 3.  Diabetes: Follow-up blood work and A1c 4.  Acute kidney injury: Follow-up with nephrology 5.  Anemia: Patient scheduled to see hematology. [EKG obtained to assist in diagnosis and management of assessed problem(s)] : EKG obtained to assist in diagnosis and management of assessed problem(s)

## 2024-05-21 LAB
ALBUMIN SERPL ELPH-MCNC: 3.9 G/DL
ALP BLD-CCNC: 125 U/L
ALT SERPL-CCNC: 5 U/L
ANION GAP SERPL CALC-SCNC: 17 MMOL/L
AST SERPL-CCNC: 10 U/L
BILIRUB SERPL-MCNC: 0.4 MG/DL
BUN SERPL-MCNC: 38 MG/DL
CALCIUM SERPL-MCNC: 8.9 MG/DL
CHLORIDE SERPL-SCNC: 107 MMOL/L
CHOLEST SERPL-MCNC: 70 MG/DL
CO2 SERPL-SCNC: 16 MMOL/L
CREAT SERPL-MCNC: 1.47 MG/DL
EGFR: 49 ML/MIN/1.73M2
ESTIMATED AVERAGE GLUCOSE: 123 MG/DL
FOLATE SERPL-MCNC: 9.2 NG/ML
GLUCOSE SERPL-MCNC: 137 MG/DL
HBA1C MFR BLD HPLC: 5.9 %
HCT VFR BLD CALC: 30.3 %
HDLC SERPL-MCNC: 32 MG/DL
HGB BLD-MCNC: 9.7 G/DL
LDLC SERPL CALC-MCNC: 23 MG/DL
MCHC RBC-ENTMCNC: 29.7 PG
MCHC RBC-ENTMCNC: 32 GM/DL
MCV RBC AUTO: 92.7 FL
NONHDLC SERPL-MCNC: 38 MG/DL
PLATELET # BLD AUTO: 182 K/UL
POTASSIUM SERPL-SCNC: 5.2 MMOL/L
PROT SERPL-MCNC: 7.6 G/DL
RBC # BLD: 3.27 M/UL
RBC # FLD: 16.8 %
SODIUM SERPL-SCNC: 140 MMOL/L
T4 SERPL-MCNC: 5.1 UG/DL
TRIGL SERPL-MCNC: 64 MG/DL
TSH SERPL-ACNC: 3.65 UIU/ML
VIT B12 SERPL-MCNC: 343 PG/ML
WBC # FLD AUTO: 9.68 K/UL

## 2024-05-21 RX ORDER — ROSUVASTATIN CALCIUM 10 MG/1
10 TABLET, FILM COATED ORAL
Qty: 90 | Refills: 3 | Status: ACTIVE | COMMUNITY
Start: 2019-02-01 | End: 1900-01-01

## 2024-06-17 RX ORDER — ENALAPRIL MALEATE 5 MG/1
5 TABLET ORAL DAILY
Qty: 90 | Refills: 3 | Status: ACTIVE | COMMUNITY
Start: 2018-11-19 | End: 1900-01-01

## 2024-07-15 ENCOUNTER — APPOINTMENT (OUTPATIENT)
Dept: HEART AND VASCULAR | Facility: CLINIC | Age: 74
End: 2024-07-15

## 2024-07-15 ENCOUNTER — APPOINTMENT (OUTPATIENT)
Dept: HEART AND VASCULAR | Facility: CLINIC | Age: 74
End: 2024-07-15
Payer: MEDICARE

## 2024-07-15 ENCOUNTER — NON-APPOINTMENT (OUTPATIENT)
Age: 74
End: 2024-07-15

## 2024-07-15 VITALS
WEIGHT: 313 LBS | DIASTOLIC BLOOD PRESSURE: 80 MMHG | RESPIRATION RATE: 12 BRPM | BODY MASS INDEX: 42.39 KG/M2 | HEART RATE: 66 BPM | HEIGHT: 72 IN | SYSTOLIC BLOOD PRESSURE: 140 MMHG

## 2024-07-15 DIAGNOSIS — R06.02 SHORTNESS OF BREATH: ICD-10-CM

## 2024-07-15 DIAGNOSIS — E11.9 TYPE 2 DIABETES MELLITUS W/OUT COMPLICATIONS: ICD-10-CM

## 2024-07-15 DIAGNOSIS — Z86.39 PERSONAL HISTORY OF OTHER ENDOCRINE, NUTRITIONAL AND METABOLIC DISEASE: ICD-10-CM

## 2024-07-15 PROCEDURE — G2211 COMPLEX E/M VISIT ADD ON: CPT

## 2024-07-15 PROCEDURE — 99214 OFFICE O/P EST MOD 30 MIN: CPT

## 2024-07-15 PROCEDURE — 93000 ELECTROCARDIOGRAM COMPLETE: CPT

## 2024-07-23 ENCOUNTER — APPOINTMENT (OUTPATIENT)
Dept: HEART AND VASCULAR | Facility: CLINIC | Age: 74
End: 2024-07-23
Payer: MEDICARE

## 2024-07-23 VITALS
DIASTOLIC BLOOD PRESSURE: 78 MMHG | WEIGHT: 311 LBS | RESPIRATION RATE: 12 BRPM | HEART RATE: 70 BPM | BODY MASS INDEX: 42.12 KG/M2 | HEIGHT: 72 IN | SYSTOLIC BLOOD PRESSURE: 140 MMHG | OXYGEN SATURATION: 99 %

## 2024-07-23 DIAGNOSIS — I10 ESSENTIAL (PRIMARY) HYPERTENSION: ICD-10-CM

## 2024-07-23 DIAGNOSIS — I48.91 UNSPECIFIED ATRIAL FIBRILLATION: ICD-10-CM

## 2024-07-23 DIAGNOSIS — R60.0 LOCALIZED EDEMA: ICD-10-CM

## 2024-07-23 PROCEDURE — G2211 COMPLEX E/M VISIT ADD ON: CPT

## 2024-07-23 PROCEDURE — 99214 OFFICE O/P EST MOD 30 MIN: CPT

## 2024-07-23 NOTE — PHYSICAL EXAM
[Well Developed] : well developed [No Acute Distress] : no acute distress [Normal Conjunctiva] : normal conjunctiva [Normal Venous Pressure] : normal venous pressure [Carotid Bruit] : carotid bruit [Normal S1, S2] : normal S1, S2 [No Rub] : no rub [No Gallop] : no gallop [Murmur] : murmur [Clear Lung Fields] : clear lung fields [Good Air Entry] : good air entry [No Respiratory Distress] : no respiratory distress  [Soft] : abdomen soft [Non Tender] : non-tender [No Masses/organomegaly] : no masses/organomegaly [Normal Bowel Sounds] : normal bowel sounds [Normal Gait] : normal gait [No Cyanosis] : no cyanosis [No Clubbing] : no clubbing [No Varicosities] : no varicosities [No Rash] : no rash [No Skin Lesions] : no skin lesions [Moves all extremities] : moves all extremities [No Focal Deficits] : no focal deficits [Normal Speech] : normal speech [Alert and Oriented] : alert and oriented [Normal memory] : normal memory [de-identified] : overweight [de-identified] : mars [de-identified] : 2+ b/l le edema

## 2024-07-23 NOTE — DISCUSSION/SUMMARY
[FreeTextEntry1] : 1.  Lower extremity edema: Some improvements, suggest taking 20 mg of torsemide 4 days a weeks and 40 mg 3 days a week.  Reviewed imaging from the hospital in April 2024 patient with signs on the CAT scan pulmonary hypertension. 2.  Hypertension: Continue current medication 3.  Atrial fibrillation:continue meds  Suspects pulm hypertension as a likely cause of worsening lower extremity edema.  Suggest sleep study and evaluation by pulmonary hypertension specialist.

## 2024-07-23 NOTE — HISTORY OF PRESENT ILLNESS
[FreeTextEntry1] : 74-year-old male with extensive past medical history comes in accompanied by his daughter and home health aide for follow-up.  Since last visit patient has lost 2 pounds no appreciable change in clinical status.  Denies chest pain shortness of breath PND orthopnea.

## 2024-07-24 LAB
ALBUMIN SERPL ELPH-MCNC: 3.2 G/DL
ALP BLD-CCNC: 141 U/L
ALT SERPL-CCNC: 10 U/L
ANION GAP SERPL CALC-SCNC: 15 MMOL/L
AST SERPL-CCNC: 12 U/L
BILIRUB SERPL-MCNC: 0.3 MG/DL
BUN SERPL-MCNC: 30 MG/DL
CALCIUM SERPL-MCNC: 8.5 MG/DL
CHLORIDE SERPL-SCNC: 108 MMOL/L
CO2 SERPL-SCNC: 18 MMOL/L
CREAT SERPL-MCNC: 1.32 MG/DL
EGFR: 56 ML/MIN/1.73M2
GLUCOSE SERPL-MCNC: 166 MG/DL
HCT VFR BLD CALC: 30.1 %
HGB BLD-MCNC: 8.8 G/DL
MCHC RBC-ENTMCNC: 28.1 PG
MCHC RBC-ENTMCNC: 29.2 GM/DL
MCV RBC AUTO: 96.2 FL
NT-PROBNP SERPL-MCNC: 2492 PG/ML
PLATELET # BLD AUTO: 274 K/UL
POTASSIUM SERPL-SCNC: 4.6 MMOL/L
PROT SERPL-MCNC: 7.1 G/DL
RBC # BLD: 3.13 M/UL
RBC # FLD: 15.5 %
SODIUM SERPL-SCNC: 141 MMOL/L
WBC # FLD AUTO: 8.65 K/UL

## 2024-07-25 LAB
FERRITIN SERPL-MCNC: 79 NG/ML
IRON SATN MFR SERPL: 11 %
IRON SERPL-MCNC: 33 UG/DL
TIBC SERPL-MCNC: 308 UG/DL
UIBC SERPL-MCNC: 274 UG/DL

## 2024-08-01 ENCOUNTER — RX RENEWAL (OUTPATIENT)
Age: 74
End: 2024-08-01

## 2024-08-20 ENCOUNTER — APPOINTMENT (OUTPATIENT)
Dept: HEART AND VASCULAR | Facility: CLINIC | Age: 74
End: 2024-08-20
Payer: MEDICARE

## 2024-08-20 VITALS
WEIGHT: 307 LBS | HEART RATE: 66 BPM | SYSTOLIC BLOOD PRESSURE: 140 MMHG | DIASTOLIC BLOOD PRESSURE: 82 MMHG | RESPIRATION RATE: 12 BRPM | BODY MASS INDEX: 41.58 KG/M2 | OXYGEN SATURATION: 97 % | HEIGHT: 72 IN

## 2024-08-20 DIAGNOSIS — R06.02 SHORTNESS OF BREATH: ICD-10-CM

## 2024-08-20 DIAGNOSIS — I48.91 UNSPECIFIED ATRIAL FIBRILLATION: ICD-10-CM

## 2024-08-20 DIAGNOSIS — Z86.39 PERSONAL HISTORY OF OTHER ENDOCRINE, NUTRITIONAL AND METABOLIC DISEASE: ICD-10-CM

## 2024-08-20 DIAGNOSIS — I10 ESSENTIAL (PRIMARY) HYPERTENSION: ICD-10-CM

## 2024-08-20 PROCEDURE — G2211 COMPLEX E/M VISIT ADD ON: CPT

## 2024-08-20 PROCEDURE — 99214 OFFICE O/P EST MOD 30 MIN: CPT

## 2024-08-20 RX ORDER — SPIRONOLACTONE 25 MG/1
25 TABLET ORAL DAILY
Qty: 30 | Refills: 3 | Status: ACTIVE | COMMUNITY
Start: 2024-08-20 | End: 1900-01-01

## 2024-08-20 NOTE — DISCUSSION/SUMMARY
[FreeTextEntry1] : 1.  Shortness of breath: Likely multifactorial sleep apnea, CHF pulmonary pretension.  Will continue torsemide 20 mg daily add Aldactone 25 mg daily discontinue potassium chloride and repeat blood work in 2 weeks.also refer to pulmonary medicine 2.  Hypertension: Continue meds and see above changes 3.  Paroxysmal atrial fibrillation: Continue carvedilol and Eliquis 4.  Hyperlipidemia: Continue current meds

## 2024-08-20 NOTE — HISTORY OF PRESENT ILLNESS
[FreeTextEntry1] : 74-year-old male returns accompanied by his daughter home health aide with a Extensive past medical history no.  Overall is at baseline continues to suffer from exertional shortness of breath.  However, denies any chest pain, PND orthopnea

## 2024-08-20 NOTE — PHYSICAL EXAM
[Well Developed] : well developed [No Acute Distress] : no acute distress [Normal Conjunctiva] : normal conjunctiva [Normal Venous Pressure] : normal venous pressure [Carotid Bruit] : carotid bruit [Normal S1, S2] : normal S1, S2 [No Rub] : no rub [No Gallop] : no gallop [Murmur] : murmur [Good Air Entry] : good air entry [No Respiratory Distress] : no respiratory distress  [Soft] : abdomen soft [Non Tender] : non-tender [No Masses/organomegaly] : no masses/organomegaly [Normal Bowel Sounds] : normal bowel sounds [Normal Gait] : normal gait [No Cyanosis] : no cyanosis [No Clubbing] : no clubbing [No Varicosities] : no varicosities [No Rash] : no rash [No Skin Lesions] : no skin lesions [Moves all extremities] : moves all extremities [No Focal Deficits] : no focal deficits [Normal Speech] : normal speech [Alert and Oriented] : alert and oriented [Normal memory] : normal memory [de-identified] : overweight [de-identified] : mras [de-identified] : mild baslar insp crackles [de-identified] : 2+ b/l le edema

## 2024-08-21 LAB
ALBUMIN SERPL ELPH-MCNC: 3.3 G/DL
ALP BLD-CCNC: 146 U/L
ALT SERPL-CCNC: 7 U/L
ANION GAP SERPL CALC-SCNC: 13 MMOL/L
AST SERPL-CCNC: 10 U/L
BILIRUB SERPL-MCNC: 0.3 MG/DL
BUN SERPL-MCNC: 29 MG/DL
CALCIUM SERPL-MCNC: 8.1 MG/DL
CHLORIDE SERPL-SCNC: 105 MMOL/L
CO2 SERPL-SCNC: 20 MMOL/L
CREAT SERPL-MCNC: 1.32 MG/DL
EGFR: 56 ML/MIN/1.73M2
FERRITIN SERPL-MCNC: 172 NG/ML
GLUCOSE SERPL-MCNC: 102 MG/DL
HCT VFR BLD CALC: 30.6 %
HGB BLD-MCNC: 9.1 G/DL
IRON SATN MFR SERPL: 20 %
IRON SERPL-MCNC: 57 UG/DL
MCHC RBC-ENTMCNC: 28.4 PG
MCHC RBC-ENTMCNC: 29.7 GM/DL
MCV RBC AUTO: 95.6 FL
NT-PROBNP SERPL-MCNC: 1793 PG/ML
PLATELET # BLD AUTO: 245 K/UL
POTASSIUM SERPL-SCNC: 4.1 MMOL/L
PROT SERPL-MCNC: 6.7 G/DL
RBC # BLD: 3.2 M/UL
RBC # FLD: 18.5 %
SODIUM SERPL-SCNC: 138 MMOL/L
T4 SERPL-MCNC: 5.5 UG/DL
TIBC SERPL-MCNC: 286 UG/DL
TSH SERPL-ACNC: 3.53 UIU/ML
UIBC SERPL-MCNC: 229 UG/DL
WBC # FLD AUTO: 10.25 K/UL

## 2024-09-10 ENCOUNTER — RX RENEWAL (OUTPATIENT)
Age: 74
End: 2024-09-10

## 2024-09-17 ENCOUNTER — APPOINTMENT (OUTPATIENT)
Dept: HEART AND VASCULAR | Facility: CLINIC | Age: 74
End: 2024-09-17
Payer: MEDICARE

## 2024-09-17 ENCOUNTER — NON-APPOINTMENT (OUTPATIENT)
Age: 74
End: 2024-09-17

## 2024-09-17 VITALS
RESPIRATION RATE: 12 BRPM | BODY MASS INDEX: 40.36 KG/M2 | DIASTOLIC BLOOD PRESSURE: 80 MMHG | HEART RATE: 64 BPM | SYSTOLIC BLOOD PRESSURE: 130 MMHG | WEIGHT: 298 LBS | HEIGHT: 72 IN

## 2024-09-17 DIAGNOSIS — I48.91 UNSPECIFIED ATRIAL FIBRILLATION: ICD-10-CM

## 2024-09-17 DIAGNOSIS — I10 ESSENTIAL (PRIMARY) HYPERTENSION: ICD-10-CM

## 2024-09-17 DIAGNOSIS — Z86.39 PERSONAL HISTORY OF OTHER ENDOCRINE, NUTRITIONAL AND METABOLIC DISEASE: ICD-10-CM

## 2024-09-17 PROCEDURE — G2211 COMPLEX E/M VISIT ADD ON: CPT

## 2024-09-17 PROCEDURE — 93000 ELECTROCARDIOGRAM COMPLETE: CPT

## 2024-09-17 PROCEDURE — 99214 OFFICE O/P EST MOD 30 MIN: CPT

## 2024-09-17 NOTE — DISCUSSION/SUMMARY
[FreeTextEntry1] : 1.  Atrial fibrillation: Continue current medication. 2.  Hypertension: Better controlled on current meds advised to continue 3.  Hyperlipidemia: Continue to maintain a low-fat diet. [EKG obtained to assist in diagnosis and management of assessed problem(s)] : EKG obtained to assist in diagnosis and management of assessed problem(s)

## 2024-09-17 NOTE — REASON FOR VISIT
[FreeTextEntry1] : 74-year-old male with a past medical history comes in clinic by his daughter for follow-up.  Since last visit patient is feeling well less short of breath.  Denies any chest pain PND orthopnea.

## 2024-09-17 NOTE — PHYSICAL EXAM
[Well Developed] : well developed [No Acute Distress] : no acute distress [Normal Conjunctiva] : normal conjunctiva [Normal Venous Pressure] : normal venous pressure [Carotid Bruit] : carotid bruit [Normal S1, S2] : normal S1, S2 [No Rub] : no rub [No Gallop] : no gallop [Murmur] : murmur [Good Air Entry] : good air entry [No Respiratory Distress] : no respiratory distress  [Soft] : abdomen soft [Non Tender] : non-tender [No Masses/organomegaly] : no masses/organomegaly [Normal Bowel Sounds] : normal bowel sounds [Normal Gait] : normal gait [No Cyanosis] : no cyanosis [No Clubbing] : no clubbing [No Varicosities] : no varicosities [No Rash] : no rash [No Skin Lesions] : no skin lesions [Moves all extremities] : moves all extremities [No Focal Deficits] : no focal deficits [Normal Speech] : normal speech [Alert and Oriented] : alert and oriented [Normal memory] : normal memory [de-identified] : overweight [de-identified] : mars [de-identified] : mild baslar insp crackles [de-identified] : 2+ b/l le edema improving

## 2024-09-18 LAB
ALBUMIN SERPL ELPH-MCNC: 3.4 G/DL
ALP BLD-CCNC: 156 U/L
ALT SERPL-CCNC: 6 U/L
ANION GAP SERPL CALC-SCNC: 14 MMOL/L
AST SERPL-CCNC: 11 U/L
BILIRUB SERPL-MCNC: 0.3 MG/DL
BUN SERPL-MCNC: 37 MG/DL
CALCIUM SERPL-MCNC: 8.1 MG/DL
CHLORIDE SERPL-SCNC: 105 MMOL/L
CO2 SERPL-SCNC: 16 MMOL/L
CREAT SERPL-MCNC: 1.35 MG/DL
EGFR: 55 ML/MIN/1.73M2
GLUCOSE SERPL-MCNC: 188 MG/DL
HCT VFR BLD CALC: 27 %
HGB BLD-MCNC: 8 G/DL
MCHC RBC-ENTMCNC: 28.8 PG
MCHC RBC-ENTMCNC: 29.6 GM/DL
MCV RBC AUTO: 97.1 FL
NT-PROBNP SERPL-MCNC: 1726 PG/ML
PLATELET # BLD AUTO: 264 K/UL
POTASSIUM SERPL-SCNC: 4.8 MMOL/L
PROT SERPL-MCNC: 6.9 G/DL
RBC # BLD: 2.78 M/UL
RBC # FLD: 17.7 %
SODIUM SERPL-SCNC: 134 MMOL/L
WBC # FLD AUTO: 11 K/UL

## 2024-12-11 RX ORDER — PEN NEEDLE, DIABETIC 32 GX 1/4"
32G X 6 MM NEEDLE, DISPOSABLE MISCELLANEOUS
Qty: 300 | Refills: 3 | Status: ACTIVE | COMMUNITY
Start: 2024-12-11 | End: 1900-01-01

## 2025-01-02 ENCOUNTER — RX RENEWAL (OUTPATIENT)
Age: 75
End: 2025-01-02

## 2025-01-02 RX ORDER — TORSEMIDE 10 MG/1
10 TABLET ORAL DAILY
Qty: 30 | Refills: 3 | Status: ACTIVE | COMMUNITY
Start: 2025-01-02 | End: 1900-01-01

## 2025-01-14 ENCOUNTER — APPOINTMENT (OUTPATIENT)
Dept: HEART AND VASCULAR | Facility: CLINIC | Age: 75
End: 2025-01-14

## 2025-01-27 ENCOUNTER — APPOINTMENT (OUTPATIENT)
Dept: HEART AND VASCULAR | Facility: CLINIC | Age: 75
End: 2025-01-27

## 2025-02-12 ENCOUNTER — NON-APPOINTMENT (OUTPATIENT)
Age: 75
End: 2025-02-12

## 2025-02-12 ENCOUNTER — APPOINTMENT (OUTPATIENT)
Dept: HEART AND VASCULAR | Facility: CLINIC | Age: 75
End: 2025-02-12
Payer: MEDICARE

## 2025-02-12 VITALS
WEIGHT: 256 LBS | HEART RATE: 62 BPM | HEIGHT: 72 IN | RESPIRATION RATE: 12 BRPM | DIASTOLIC BLOOD PRESSURE: 60 MMHG | SYSTOLIC BLOOD PRESSURE: 96 MMHG | BODY MASS INDEX: 34.67 KG/M2

## 2025-02-12 DIAGNOSIS — I48.91 UNSPECIFIED ATRIAL FIBRILLATION: ICD-10-CM

## 2025-02-12 DIAGNOSIS — I10 ESSENTIAL (PRIMARY) HYPERTENSION: ICD-10-CM

## 2025-02-12 DIAGNOSIS — Z86.39 PERSONAL HISTORY OF OTHER ENDOCRINE, NUTRITIONAL AND METABOLIC DISEASE: ICD-10-CM

## 2025-02-12 PROCEDURE — 99214 OFFICE O/P EST MOD 30 MIN: CPT

## 2025-02-12 PROCEDURE — G2211 COMPLEX E/M VISIT ADD ON: CPT

## 2025-02-12 PROCEDURE — 93000 ELECTROCARDIOGRAM COMPLETE: CPT

## 2025-02-12 RX ORDER — PANTOPRAZOLE SODIUM 40 MG/1
40 GRANULE, DELAYED RELEASE ORAL
Refills: 0 | Status: ACTIVE | COMMUNITY

## 2025-02-12 RX ORDER — TAMSULOSIN HYDROCHLORIDE 0.4 MG/1
0.4 CAPSULE ORAL
Qty: 60 | Refills: 6 | Status: ACTIVE | COMMUNITY

## 2025-02-12 RX ORDER — FINASTERIDE 5 MG/1
5 TABLET, FILM COATED ORAL
Refills: 0 | Status: ACTIVE | COMMUNITY

## 2025-04-22 NOTE — PLAN
[FreeTextEntry1] : 1. URI: Will repeat CBC obtained respiratory viral panel. Will advise once results are known.
Yes...

## 2025-05-14 ENCOUNTER — NON-APPOINTMENT (OUTPATIENT)
Age: 75
End: 2025-05-14

## 2025-05-14 ENCOUNTER — APPOINTMENT (OUTPATIENT)
Dept: HEART AND VASCULAR | Facility: CLINIC | Age: 75
End: 2025-05-14
Payer: MEDICARE

## 2025-05-14 VITALS
HEIGHT: 72 IN | WEIGHT: 270 LBS | DIASTOLIC BLOOD PRESSURE: 76 MMHG | BODY MASS INDEX: 36.57 KG/M2 | SYSTOLIC BLOOD PRESSURE: 120 MMHG | HEART RATE: 65 BPM | RESPIRATION RATE: 12 BRPM

## 2025-05-14 DIAGNOSIS — I10 ESSENTIAL (PRIMARY) HYPERTENSION: ICD-10-CM

## 2025-05-14 DIAGNOSIS — E11.9 TYPE 2 DIABETES MELLITUS W/OUT COMPLICATIONS: ICD-10-CM

## 2025-05-14 DIAGNOSIS — I48.91 UNSPECIFIED ATRIAL FIBRILLATION: ICD-10-CM

## 2025-05-14 DIAGNOSIS — Z86.39 PERSONAL HISTORY OF OTHER ENDOCRINE, NUTRITIONAL AND METABOLIC DISEASE: ICD-10-CM

## 2025-05-14 PROCEDURE — G2211 COMPLEX E/M VISIT ADD ON: CPT

## 2025-05-14 PROCEDURE — 93000 ELECTROCARDIOGRAM COMPLETE: CPT

## 2025-05-14 PROCEDURE — 99214 OFFICE O/P EST MOD 30 MIN: CPT

## 2025-05-22 LAB
ALBUMIN SERPL ELPH-MCNC: 3.9 G/DL
ALP BLD-CCNC: 147 U/L
ALT SERPL-CCNC: 5 U/L
ANION GAP SERPL CALC-SCNC: 17 MMOL/L
AST SERPL-CCNC: 9 U/L
BILIRUB SERPL-MCNC: 0.2 MG/DL
BUN SERPL-MCNC: 44 MG/DL
CALCIUM SERPL-MCNC: 8.8 MG/DL
CHLORIDE SERPL-SCNC: 107 MMOL/L
CHOLEST SERPL-MCNC: 85 MG/DL
CO2 SERPL-SCNC: 17 MMOL/L
CREAT SERPL-MCNC: 1.56 MG/DL
EGFRCR SERPLBLD CKD-EPI 2021: 46 ML/MIN/1.73M2
ESTIMATED AVERAGE GLUCOSE: 137 MG/DL
FERRITIN SERPL-MCNC: 188 NG/ML
FOLATE SERPL-MCNC: 4.7 NG/ML
GLUCOSE SERPL-MCNC: 123 MG/DL
HBA1C MFR BLD HPLC: 6.4 %
HCT VFR BLD CALC: 28.6 %
HDLC SERPL-MCNC: 37 MG/DL
HGB BLD-MCNC: 9.1 G/DL
IRON SATN MFR SERPL: 15 %
IRON SERPL-MCNC: 45 UG/DL
LDLC SERPL-MCNC: 30 MG/DL
MCHC RBC-ENTMCNC: 30.7 PG
MCHC RBC-ENTMCNC: 31.8 G/DL
MCV RBC AUTO: 96.6 FL
NONHDLC SERPL-MCNC: 47 MG/DL
PLATELET # BLD AUTO: 176 K/UL
POTASSIUM SERPL-SCNC: 5.6 MMOL/L
PROT SERPL-MCNC: 7.2 G/DL
RBC # BLD: 2.96 M/UL
RBC # FLD: 15.2 %
SODIUM SERPL-SCNC: 141 MMOL/L
T3FREE SERPL-MCNC: 1.67 PG/ML
T4 FREE SERPL-MCNC: 1 NG/DL
T4 SERPL-MCNC: 5 UG/DL
TIBC SERPL-MCNC: 303 UG/DL
TRIGL SERPL-MCNC: 85 MG/DL
TSH SERPL-ACNC: 1.94 UIU/ML
UIBC SERPL-MCNC: 258 UG/DL
VIT B12 SERPL-MCNC: 555 PG/ML
WBC # FLD AUTO: 10.45 K/UL

## 2025-06-05 ENCOUNTER — RX RENEWAL (OUTPATIENT)
Age: 75
End: 2025-06-05

## 2025-06-10 ENCOUNTER — RX RENEWAL (OUTPATIENT)
Age: 75
End: 2025-06-10

## 2025-07-02 ENCOUNTER — APPOINTMENT (OUTPATIENT)
Dept: HEART AND VASCULAR | Facility: CLINIC | Age: 75
End: 2025-07-02

## 2025-07-02 ENCOUNTER — APPOINTMENT (OUTPATIENT)
Dept: HEART AND VASCULAR | Facility: CLINIC | Age: 75
End: 2025-07-02
Payer: MEDICARE

## 2025-07-02 VITALS
RESPIRATION RATE: 12 BRPM | HEART RATE: 65 BPM | BODY MASS INDEX: 38.19 KG/M2 | SYSTOLIC BLOOD PRESSURE: 108 MMHG | DIASTOLIC BLOOD PRESSURE: 56 MMHG | OXYGEN SATURATION: 95 % | WEIGHT: 282 LBS | HEIGHT: 72 IN

## 2025-07-02 PROCEDURE — G2211 COMPLEX E/M VISIT ADD ON: CPT

## 2025-07-02 PROCEDURE — 93000 ELECTROCARDIOGRAM COMPLETE: CPT

## 2025-07-02 PROCEDURE — 99214 OFFICE O/P EST MOD 30 MIN: CPT

## 2025-07-02 PROCEDURE — 93306 TTE W/DOPPLER COMPLETE: CPT

## 2025-07-03 LAB
ALBUMIN SERPL ELPH-MCNC: 4 G/DL
ALP BLD-CCNC: 135 U/L
ALT SERPL-CCNC: 7 U/L
ANION GAP SERPL CALC-SCNC: 20 MMOL/L
AST SERPL-CCNC: 11 U/L
BILIRUB SERPL-MCNC: 0.2 MG/DL
BUN SERPL-MCNC: 47 MG/DL
CALCIUM SERPL-MCNC: 8.8 MG/DL
CHLORIDE SERPL-SCNC: 104 MMOL/L
CO2 SERPL-SCNC: 16 MMOL/L
CREAT SERPL-MCNC: 1.69 MG/DL
EGFRCR SERPLBLD CKD-EPI 2021: 42 ML/MIN/1.73M2
GLUCOSE SERPL-MCNC: 159 MG/DL
HCT VFR BLD CALC: 31.1 %
HGB BLD-MCNC: 9.4 G/DL
MCHC RBC-ENTMCNC: 30.2 G/DL
MCHC RBC-ENTMCNC: 30.6 PG
MCV RBC AUTO: 101.3 FL
NT-PROBNP SERPL-MCNC: 1216 PG/ML
PLATELET # BLD AUTO: 201 K/UL
POTASSIUM SERPL-SCNC: 4.7 MMOL/L
PROT SERPL-MCNC: 7 G/DL
RBC # BLD: 3.07 M/UL
RBC # FLD: 16.7 %
SODIUM SERPL-SCNC: 140 MMOL/L
WBC # FLD AUTO: 9.5 K/UL

## 2025-08-27 ENCOUNTER — APPOINTMENT (OUTPATIENT)
Dept: HEART AND VASCULAR | Facility: CLINIC | Age: 75
End: 2025-08-27
Payer: MEDICARE

## 2025-08-27 VITALS
SYSTOLIC BLOOD PRESSURE: 102 MMHG | HEART RATE: 64 BPM | RESPIRATION RATE: 12 BRPM | HEIGHT: 72 IN | DIASTOLIC BLOOD PRESSURE: 70 MMHG | WEIGHT: 276.5 LBS | BODY MASS INDEX: 37.45 KG/M2

## 2025-08-27 DIAGNOSIS — N17.9 ACUTE KIDNEY FAILURE, UNSPECIFIED: ICD-10-CM

## 2025-08-27 DIAGNOSIS — I48.91 UNSPECIFIED ATRIAL FIBRILLATION: ICD-10-CM

## 2025-08-27 PROCEDURE — 99214 OFFICE O/P EST MOD 30 MIN: CPT

## 2025-08-27 PROCEDURE — G2211 COMPLEX E/M VISIT ADD ON: CPT

## 2025-08-29 LAB
ALBUMIN SERPL ELPH-MCNC: 4.1 G/DL
ALP BLD-CCNC: 111 U/L
ALT SERPL-CCNC: 8 U/L
ANION GAP SERPL CALC-SCNC: 14 MMOL/L
AST SERPL-CCNC: 9 U/L
BILIRUB SERPL-MCNC: 0.4 MG/DL
BUN SERPL-MCNC: 57 MG/DL
CALCIUM SERPL-MCNC: 9.3 MG/DL
CHLORIDE SERPL-SCNC: 102 MMOL/L
CO2 SERPL-SCNC: 20 MMOL/L
CREAT SERPL-MCNC: 2.1 MG/DL
EGFRCR SERPLBLD CKD-EPI 2021: 32 ML/MIN/1.73M2
GLUCOSE SERPL-MCNC: 186 MG/DL
HCT VFR BLD CALC: 36.5 %
HGB BLD-MCNC: 11 G/DL
MCHC RBC-ENTMCNC: 30.1 G/DL
MCHC RBC-ENTMCNC: 30.3 PG
MCV RBC AUTO: 100.6 FL
NT-PROBNP SERPL-MCNC: 965 PG/ML
PLATELET # BLD AUTO: 173 K/UL
POTASSIUM SERPL-SCNC: 4.9 MMOL/L
PROT SERPL-MCNC: 7.5 G/DL
RBC # BLD: 3.63 M/UL
RBC # FLD: 14.4 %
SODIUM SERPL-SCNC: 136 MMOL/L
WBC # FLD AUTO: 13.33 K/UL